# Patient Record
Sex: FEMALE | Race: OTHER | Employment: FULL TIME | URBAN - METROPOLITAN AREA
[De-identification: names, ages, dates, MRNs, and addresses within clinical notes are randomized per-mention and may not be internally consistent; named-entity substitution may affect disease eponyms.]

---

## 2021-10-05 ENCOUNTER — HOSPITAL ENCOUNTER (EMERGENCY)
Age: 21
Discharge: HOME | End: 2021-10-05
Attending: EMERGENCY MEDICINE

## 2021-10-05 ENCOUNTER — APPOINTMENT (OUTPATIENT)
Dept: CT IMAGING | Age: 21
End: 2021-10-05
Attending: EMERGENCY MEDICINE

## 2021-10-05 ENCOUNTER — ANESTHESIA (OUTPATIENT)
Dept: SURGERY | Age: 21
End: 2021-10-05

## 2021-10-05 ENCOUNTER — ANESTHESIA EVENT (OUTPATIENT)
Dept: SURGERY | Age: 21
End: 2021-10-05

## 2021-10-05 VITALS
BODY MASS INDEX: 27.6 KG/M2 | RESPIRATION RATE: 18 BRPM | TEMPERATURE: 98.1 F | DIASTOLIC BLOOD PRESSURE: 58 MMHG | OXYGEN SATURATION: 97 % | HEART RATE: 65 BPM | HEIGHT: 62 IN | WEIGHT: 150 LBS | SYSTOLIC BLOOD PRESSURE: 107 MMHG

## 2021-10-05 DIAGNOSIS — R10.31 ABDOMINAL PAIN, RIGHT LOWER QUADRANT: ICD-10-CM

## 2021-10-05 DIAGNOSIS — K35.30 ACUTE APPENDICITIS WITH LOCALIZED PERITONITIS, WITHOUT PERFORATION, ABSCESS, OR GANGRENE: Primary | ICD-10-CM

## 2021-10-05 DIAGNOSIS — N83.201 CYST OF RIGHT OVARY: ICD-10-CM

## 2021-10-05 PROBLEM — K35.20 ACUTE APPENDICITIS WITH GENERALIZED PERITONITIS, WITHOUT GANGRENE OR ABSCESS: Status: ACTIVE | Noted: 2021-10-05

## 2021-10-05 LAB
ABO + RH BLD: NORMAL
ALBUMIN SERPL-MCNC: 4 G/DL (ref 3.5–5)
ALBUMIN/GLOB SERPL: 1 {RATIO} (ref 1.2–3.5)
ALP SERPL-CCNC: 80 U/L (ref 50–136)
ALT SERPL-CCNC: 42 U/L (ref 12–65)
ANION GAP SERPL CALC-SCNC: 7 MMOL/L (ref 7–16)
AST SERPL-CCNC: 18 U/L (ref 15–37)
BASOPHILS # BLD: 0.1 K/UL (ref 0–0.2)
BASOPHILS NFR BLD: 1 % (ref 0–2)
BILIRUB SERPL-MCNC: 0.8 MG/DL (ref 0.2–1.1)
BLOOD GROUP ANTIBODIES SERPL: NORMAL
BUN SERPL-MCNC: 11 MG/DL (ref 6–23)
CALCIUM SERPL-MCNC: 9.5 MG/DL (ref 8.3–10.4)
CHLORIDE SERPL-SCNC: 105 MMOL/L (ref 98–107)
CO2 SERPL-SCNC: 28 MMOL/L (ref 21–32)
COVID-19 RAPID TEST, COVR: NOT DETECTED
CREAT SERPL-MCNC: 0.73 MG/DL (ref 0.6–1)
DIFFERENTIAL METHOD BLD: ABNORMAL
EOSINOPHIL # BLD: 0.3 K/UL (ref 0–0.8)
EOSINOPHIL NFR BLD: 2 % (ref 0.5–7.8)
ERYTHROCYTE [DISTWIDTH] IN BLOOD BY AUTOMATED COUNT: 12.8 % (ref 11.9–14.6)
GLOBULIN SER CALC-MCNC: 4.1 G/DL (ref 2.3–3.5)
GLUCOSE SERPL-MCNC: 69 MG/DL (ref 65–100)
HCG UR QL: NEGATIVE
HCT VFR BLD AUTO: 38.8 % (ref 35.8–46.3)
HGB BLD-MCNC: 12.6 G/DL (ref 11.7–15.4)
IMM GRANULOCYTES # BLD AUTO: 0.1 K/UL (ref 0–0.5)
IMM GRANULOCYTES NFR BLD AUTO: 1 % (ref 0–5)
LIPASE SERPL-CCNC: 104 U/L (ref 73–393)
LYMPHOCYTES # BLD: 3.1 K/UL (ref 0.5–4.6)
LYMPHOCYTES NFR BLD: 22 % (ref 13–44)
MCH RBC QN AUTO: 29.2 PG (ref 26.1–32.9)
MCHC RBC AUTO-ENTMCNC: 32.5 G/DL (ref 31.4–35)
MCV RBC AUTO: 89.8 FL (ref 79.6–97.8)
MONOCYTES # BLD: 0.7 K/UL (ref 0.1–1.3)
MONOCYTES NFR BLD: 5 % (ref 4–12)
NEUTS SEG # BLD: 10.2 K/UL (ref 1.7–8.2)
NEUTS SEG NFR BLD: 71 % (ref 43–78)
NRBC # BLD: 0 K/UL (ref 0–0.2)
PLATELET # BLD AUTO: 278 K/UL (ref 150–450)
PMV BLD AUTO: 10.1 FL (ref 9.4–12.3)
POTASSIUM SERPL-SCNC: 3.3 MMOL/L (ref 3.5–5.1)
PROT SERPL-MCNC: 8.1 G/DL (ref 6.3–8.2)
RBC # BLD AUTO: 4.32 M/UL (ref 4.05–5.2)
SODIUM SERPL-SCNC: 140 MMOL/L (ref 136–145)
SOURCE, COVRS: NORMAL
SPECIMEN EXP DATE BLD: NORMAL
WBC # BLD AUTO: 14.3 K/UL (ref 4.3–11.1)

## 2021-10-05 PROCEDURE — 74011000636 HC RX REV CODE- 636: Performed by: EMERGENCY MEDICINE

## 2021-10-05 PROCEDURE — 74011250636 HC RX REV CODE- 250/636: Performed by: ANESTHESIOLOGY

## 2021-10-05 PROCEDURE — 81025 URINE PREGNANCY TEST: CPT

## 2021-10-05 PROCEDURE — 77030007955 HC PCH ENDOSC SPEC J&J -B: Performed by: SURGERY

## 2021-10-05 PROCEDURE — 77030011810 HC STPLR ENDOSC J&J -G: Performed by: SURGERY

## 2021-10-05 PROCEDURE — 99282 EMERGENCY DEPT VISIT SF MDM: CPT

## 2021-10-05 PROCEDURE — 2709999900 HC NON-CHARGEABLE SUPPLY: Performed by: SURGERY

## 2021-10-05 PROCEDURE — 96374 THER/PROPH/DIAG INJ IV PUSH: CPT

## 2021-10-05 PROCEDURE — 44970 LAPAROSCOPY APPENDECTOMY: CPT | Performed by: SURGERY

## 2021-10-05 PROCEDURE — 77030039425 HC BLD LARYNG TRULITE DISP TELE -A: Performed by: ANESTHESIOLOGY

## 2021-10-05 PROCEDURE — 99223 1ST HOSP IP/OBS HIGH 75: CPT | Performed by: SURGERY

## 2021-10-05 PROCEDURE — 77030020829: Performed by: SURGERY

## 2021-10-05 PROCEDURE — 74011000250 HC RX REV CODE- 250: Performed by: SURGERY

## 2021-10-05 PROCEDURE — 76210000020 HC REC RM PH II FIRST 0.5 HR: Performed by: SURGERY

## 2021-10-05 PROCEDURE — 77030016151 HC PROTCTR LNS DFOG COVD -B: Performed by: SURGERY

## 2021-10-05 PROCEDURE — 88173 CYTOPATH EVAL FNA REPORT: CPT

## 2021-10-05 PROCEDURE — 58661 LAPAROSCOPY REMOVE ADNEXA: CPT | Performed by: SURGERY

## 2021-10-05 PROCEDURE — 77030031139 HC SUT VCRL2 J&J -A: Performed by: SURGERY

## 2021-10-05 PROCEDURE — 88304 TISSUE EXAM BY PATHOLOGIST: CPT

## 2021-10-05 PROCEDURE — 77030012799 HC TRCR GELPRT BLN AMR -B: Performed by: SURGERY

## 2021-10-05 PROCEDURE — 76060000034 HC ANESTHESIA 1.5 TO 2 HR: Performed by: SURGERY

## 2021-10-05 PROCEDURE — 77030009967 HC RELD STPLR ENDOSC J&J -C: Performed by: SURGERY

## 2021-10-05 PROCEDURE — 74177 CT ABD & PELVIS W/CONTRAST: CPT

## 2021-10-05 PROCEDURE — 77030040922 HC BLNKT HYPOTHRM STRY -A: Performed by: ANESTHESIOLOGY

## 2021-10-05 PROCEDURE — 74011000250 HC RX REV CODE- 250: Performed by: NURSE ANESTHETIST, CERTIFIED REGISTERED

## 2021-10-05 PROCEDURE — 81003 URINALYSIS AUTO W/O SCOPE: CPT

## 2021-10-05 PROCEDURE — 77030040361 HC SLV COMPR DVT MDII -B: Performed by: SURGERY

## 2021-10-05 PROCEDURE — 76010000162 HC OR TIME 1.5 TO 2 HR INTENSV-TIER 1: Performed by: SURGERY

## 2021-10-05 PROCEDURE — 77030040830 HC CATH URETH FOL MDII -A: Performed by: SURGERY

## 2021-10-05 PROCEDURE — 77030008771 HC TU NG SALEM SUMP -A: Performed by: ANESTHESIOLOGY

## 2021-10-05 PROCEDURE — 87635 SARS-COV-2 COVID-19 AMP PRB: CPT

## 2021-10-05 PROCEDURE — 74011250637 HC RX REV CODE- 250/637: Performed by: ANESTHESIOLOGY

## 2021-10-05 PROCEDURE — 77030037088 HC TUBE ENDOTRACH ORAL NSL COVD-A: Performed by: ANESTHESIOLOGY

## 2021-10-05 PROCEDURE — 86901 BLOOD TYPING SEROLOGIC RH(D): CPT

## 2021-10-05 PROCEDURE — 77030008756 HC TU IRR SUC STRY -B: Performed by: SURGERY

## 2021-10-05 PROCEDURE — 85025 COMPLETE CBC W/AUTO DIFF WBC: CPT

## 2021-10-05 PROCEDURE — 83690 ASSAY OF LIPASE: CPT

## 2021-10-05 PROCEDURE — 58661 LAPAROSCOPY REMOVE ADNEXA: CPT | Performed by: OBSTETRICS & GYNECOLOGY

## 2021-10-05 PROCEDURE — 76210000006 HC OR PH I REC 0.5 TO 1 HR: Performed by: SURGERY

## 2021-10-05 PROCEDURE — 74011000258 HC RX REV CODE- 258: Performed by: NURSE PRACTITIONER

## 2021-10-05 PROCEDURE — 77030008606 HC TRCR ENDOSC KII AMR -B: Performed by: SURGERY

## 2021-10-05 PROCEDURE — 77030034154 HC SHR COAG HARM ACE J&J -F: Performed by: SURGERY

## 2021-10-05 PROCEDURE — 77030019908 HC STETH ESOPH SIMS -A: Performed by: ANESTHESIOLOGY

## 2021-10-05 PROCEDURE — 74011250636 HC RX REV CODE- 250/636: Performed by: NURSE PRACTITIONER

## 2021-10-05 PROCEDURE — 74011250636 HC RX REV CODE- 250/636: Performed by: NURSE ANESTHETIST, CERTIFIED REGISTERED

## 2021-10-05 PROCEDURE — 80053 COMPREHEN METABOLIC PANEL: CPT

## 2021-10-05 PROCEDURE — 74011000258 HC RX REV CODE- 258: Performed by: EMERGENCY MEDICINE

## 2021-10-05 RX ORDER — KETOROLAC TROMETHAMINE 30 MG/ML
INJECTION, SOLUTION INTRAMUSCULAR; INTRAVENOUS AS NEEDED
Status: DISCONTINUED | OUTPATIENT
Start: 2021-10-05 | End: 2021-10-05 | Stop reason: HOSPADM

## 2021-10-05 RX ORDER — OXYCODONE AND ACETAMINOPHEN 5; 325 MG/1; MG/1
1 TABLET ORAL
Qty: 20 TABLET | Refills: 0 | Status: SHIPPED | OUTPATIENT
Start: 2021-10-05 | End: 2021-10-10

## 2021-10-05 RX ORDER — ACETAMINOPHEN 500 MG
1000 TABLET ORAL ONCE
Status: COMPLETED | OUTPATIENT
Start: 2021-10-05 | End: 2021-10-05

## 2021-10-05 RX ORDER — NALOXONE HYDROCHLORIDE 0.4 MG/ML
0.2 INJECTION, SOLUTION INTRAMUSCULAR; INTRAVENOUS; SUBCUTANEOUS
Status: DISCONTINUED | OUTPATIENT
Start: 2021-10-05 | End: 2021-10-05 | Stop reason: HOSPADM

## 2021-10-05 RX ORDER — SODIUM CHLORIDE 0.9 % (FLUSH) 0.9 %
5-10 SYRINGE (ML) INJECTION EVERY 8 HOURS
Status: DISCONTINUED | OUTPATIENT
Start: 2021-10-05 | End: 2021-10-06 | Stop reason: HOSPADM

## 2021-10-05 RX ORDER — MIDAZOLAM HYDROCHLORIDE 1 MG/ML
2 INJECTION, SOLUTION INTRAMUSCULAR; INTRAVENOUS
Status: DISCONTINUED | OUTPATIENT
Start: 2021-10-05 | End: 2021-10-05 | Stop reason: HOSPADM

## 2021-10-05 RX ORDER — SODIUM CHLORIDE, SODIUM LACTATE, POTASSIUM CHLORIDE, CALCIUM CHLORIDE 600; 310; 30; 20 MG/100ML; MG/100ML; MG/100ML; MG/100ML
25 INJECTION, SOLUTION INTRAVENOUS CONTINUOUS
Status: DISCONTINUED | OUTPATIENT
Start: 2021-10-05 | End: 2021-10-05 | Stop reason: HOSPADM

## 2021-10-05 RX ORDER — HALOPERIDOL 5 MG/ML
1 INJECTION INTRAMUSCULAR
Status: DISCONTINUED | OUTPATIENT
Start: 2021-10-05 | End: 2021-10-06 | Stop reason: HOSPADM

## 2021-10-05 RX ORDER — GLYCOPYRROLATE 0.2 MG/ML
INJECTION INTRAMUSCULAR; INTRAVENOUS AS NEEDED
Status: DISCONTINUED | OUTPATIENT
Start: 2021-10-05 | End: 2021-10-05 | Stop reason: HOSPADM

## 2021-10-05 RX ORDER — OXYCODONE HYDROCHLORIDE 5 MG/1
5 TABLET ORAL
Status: COMPLETED | OUTPATIENT
Start: 2021-10-05 | End: 2021-10-05

## 2021-10-05 RX ORDER — SODIUM CHLORIDE 0.9 % (FLUSH) 0.9 %
10 SYRINGE (ML) INJECTION
Status: COMPLETED | OUTPATIENT
Start: 2021-10-05 | End: 2021-10-05

## 2021-10-05 RX ORDER — LIDOCAINE HYDROCHLORIDE 20 MG/ML
INJECTION, SOLUTION EPIDURAL; INFILTRATION; INTRACAUDAL; PERINEURAL AS NEEDED
Status: DISCONTINUED | OUTPATIENT
Start: 2021-10-05 | End: 2021-10-05 | Stop reason: HOSPADM

## 2021-10-05 RX ORDER — NALOXONE HYDROCHLORIDE 0.4 MG/ML
0.2 INJECTION, SOLUTION INTRAMUSCULAR; INTRAVENOUS; SUBCUTANEOUS AS NEEDED
Status: DISCONTINUED | OUTPATIENT
Start: 2021-10-05 | End: 2021-10-06 | Stop reason: HOSPADM

## 2021-10-05 RX ORDER — SODIUM CHLORIDE, SODIUM LACTATE, POTASSIUM CHLORIDE, CALCIUM CHLORIDE 600; 310; 30; 20 MG/100ML; MG/100ML; MG/100ML; MG/100ML
75 INJECTION, SOLUTION INTRAVENOUS CONTINUOUS
Status: DISCONTINUED | OUTPATIENT
Start: 2021-10-05 | End: 2021-10-06 | Stop reason: HOSPADM

## 2021-10-05 RX ORDER — HYDROMORPHONE HYDROCHLORIDE 2 MG/ML
0.5 INJECTION, SOLUTION INTRAMUSCULAR; INTRAVENOUS; SUBCUTANEOUS
Status: DISCONTINUED | OUTPATIENT
Start: 2021-10-05 | End: 2021-10-06 | Stop reason: HOSPADM

## 2021-10-05 RX ORDER — NEOSTIGMINE METHYLSULFATE 1 MG/ML
INJECTION, SOLUTION INTRAVENOUS AS NEEDED
Status: DISCONTINUED | OUTPATIENT
Start: 2021-10-05 | End: 2021-10-05 | Stop reason: HOSPADM

## 2021-10-05 RX ORDER — KETAMINE HYDROCHLORIDE 50 MG/ML
INJECTION, SOLUTION INTRAMUSCULAR; INTRAVENOUS AS NEEDED
Status: DISCONTINUED | OUTPATIENT
Start: 2021-10-05 | End: 2021-10-05 | Stop reason: HOSPADM

## 2021-10-05 RX ORDER — FENTANYL CITRATE 50 UG/ML
100 INJECTION, SOLUTION INTRAMUSCULAR; INTRAVENOUS ONCE
Status: DISCONTINUED | OUTPATIENT
Start: 2021-10-05 | End: 2021-10-05 | Stop reason: HOSPADM

## 2021-10-05 RX ORDER — DEXAMETHASONE SODIUM PHOSPHATE 4 MG/ML
INJECTION, SOLUTION INTRA-ARTICULAR; INTRALESIONAL; INTRAMUSCULAR; INTRAVENOUS; SOFT TISSUE AS NEEDED
Status: DISCONTINUED | OUTPATIENT
Start: 2021-10-05 | End: 2021-10-05 | Stop reason: HOSPADM

## 2021-10-05 RX ORDER — LIDOCAINE HYDROCHLORIDE 10 MG/ML
0.1 INJECTION INFILTRATION; PERINEURAL AS NEEDED
Status: DISCONTINUED | OUTPATIENT
Start: 2021-10-05 | End: 2021-10-05 | Stop reason: HOSPADM

## 2021-10-05 RX ORDER — BUPIVACAINE HYDROCHLORIDE AND EPINEPHRINE 5; 5 MG/ML; UG/ML
INJECTION, SOLUTION EPIDURAL; INTRACAUDAL; PERINEURAL AS NEEDED
Status: DISCONTINUED | OUTPATIENT
Start: 2021-10-05 | End: 2021-10-05 | Stop reason: HOSPADM

## 2021-10-05 RX ORDER — SUCCINYLCHOLINE CHLORIDE 20 MG/ML
INJECTION INTRAMUSCULAR; INTRAVENOUS AS NEEDED
Status: DISCONTINUED | OUTPATIENT
Start: 2021-10-05 | End: 2021-10-05 | Stop reason: HOSPADM

## 2021-10-05 RX ORDER — MIDAZOLAM HYDROCHLORIDE 1 MG/ML
2 INJECTION, SOLUTION INTRAMUSCULAR; INTRAVENOUS ONCE
Status: DISCONTINUED | OUTPATIENT
Start: 2021-10-05 | End: 2021-10-05 | Stop reason: HOSPADM

## 2021-10-05 RX ORDER — PROPOFOL 10 MG/ML
INJECTION, EMULSION INTRAVENOUS AS NEEDED
Status: DISCONTINUED | OUTPATIENT
Start: 2021-10-05 | End: 2021-10-05 | Stop reason: HOSPADM

## 2021-10-05 RX ORDER — ONDANSETRON 2 MG/ML
4 INJECTION INTRAMUSCULAR; INTRAVENOUS
Status: DISCONTINUED | OUTPATIENT
Start: 2021-10-05 | End: 2021-10-05 | Stop reason: HOSPADM

## 2021-10-05 RX ORDER — FENTANYL CITRATE 50 UG/ML
INJECTION, SOLUTION INTRAMUSCULAR; INTRAVENOUS AS NEEDED
Status: DISCONTINUED | OUTPATIENT
Start: 2021-10-05 | End: 2021-10-05 | Stop reason: HOSPADM

## 2021-10-05 RX ORDER — ROCURONIUM BROMIDE 10 MG/ML
INJECTION, SOLUTION INTRAVENOUS AS NEEDED
Status: DISCONTINUED | OUTPATIENT
Start: 2021-10-05 | End: 2021-10-05 | Stop reason: HOSPADM

## 2021-10-05 RX ORDER — SODIUM CHLORIDE 0.9 % (FLUSH) 0.9 %
5-10 SYRINGE (ML) INJECTION AS NEEDED
Status: DISCONTINUED | OUTPATIENT
Start: 2021-10-05 | End: 2021-10-06 | Stop reason: HOSPADM

## 2021-10-05 RX ORDER — ONDANSETRON 2 MG/ML
INJECTION INTRAMUSCULAR; INTRAVENOUS AS NEEDED
Status: DISCONTINUED | OUTPATIENT
Start: 2021-10-05 | End: 2021-10-05 | Stop reason: HOSPADM

## 2021-10-05 RX ADMIN — Medication 10 ML: at 16:08

## 2021-10-05 RX ADMIN — FENTANYL CITRATE 100 MCG: 50 INJECTION INTRAMUSCULAR; INTRAVENOUS at 19:11

## 2021-10-05 RX ADMIN — Medication 3 MG: at 20:30

## 2021-10-05 RX ADMIN — GLYCOPYRROLATE 0.8 MG: 0.2 INJECTION, SOLUTION INTRAMUSCULAR; INTRAVENOUS at 20:30

## 2021-10-05 RX ADMIN — ROCURONIUM BROMIDE 20 MG: 10 INJECTION, SOLUTION INTRAVENOUS at 19:13

## 2021-10-05 RX ADMIN — FENTANYL CITRATE 25 MCG: 50 INJECTION INTRAMUSCULAR; INTRAVENOUS at 20:56

## 2021-10-05 RX ADMIN — SODIUM CHLORIDE, SODIUM LACTATE, POTASSIUM CHLORIDE, AND CALCIUM CHLORIDE: 600; 310; 30; 20 INJECTION, SOLUTION INTRAVENOUS at 20:04

## 2021-10-05 RX ADMIN — HYDROMORPHONE HYDROCHLORIDE 0.5 MG: 2 INJECTION, SOLUTION INTRAMUSCULAR; INTRAVENOUS; SUBCUTANEOUS at 21:19

## 2021-10-05 RX ADMIN — ROCURONIUM BROMIDE 10 MG: 10 INJECTION, SOLUTION INTRAVENOUS at 19:11

## 2021-10-05 RX ADMIN — SODIUM CHLORIDE 100 ML: 900 INJECTION, SOLUTION INTRAVENOUS at 16:08

## 2021-10-05 RX ADMIN — KETAMINE HYDROCHLORIDE 30 MG: 50 INJECTION INTRAMUSCULAR; INTRAVENOUS at 19:11

## 2021-10-05 RX ADMIN — DEXAMETHASONE SODIUM PHOSPHATE 4 MG: 4 INJECTION, SOLUTION INTRAMUSCULAR; INTRAVENOUS at 19:31

## 2021-10-05 RX ADMIN — ACETAMINOPHEN 1000 MG: 500 TABLET ORAL at 18:23

## 2021-10-05 RX ADMIN — KETOROLAC TROMETHAMINE 30 MG: 30 INJECTION, SOLUTION INTRAMUSCULAR; INTRAVENOUS at 20:29

## 2021-10-05 RX ADMIN — LIDOCAINE HYDROCHLORIDE 80 MG: 20 INJECTION, SOLUTION EPIDURAL; INFILTRATION; INTRACAUDAL; PERINEURAL at 19:11

## 2021-10-05 RX ADMIN — FENTANYL CITRATE 50 MCG: 50 INJECTION INTRAMUSCULAR; INTRAVENOUS at 19:27

## 2021-10-05 RX ADMIN — SUCCINYLCHOLINE CHLORIDE 140 MG: 20 INJECTION, SOLUTION INTRAMUSCULAR; INTRAVENOUS at 19:11

## 2021-10-05 RX ADMIN — ONDANSETRON 4 MG: 2 INJECTION INTRAMUSCULAR; INTRAVENOUS at 20:28

## 2021-10-05 RX ADMIN — OXYCODONE 5 MG: 5 TABLET ORAL at 21:07

## 2021-10-05 RX ADMIN — ROCURONIUM BROMIDE 10 MG: 10 INJECTION, SOLUTION INTRAVENOUS at 19:46

## 2021-10-05 RX ADMIN — PHENYLEPHRINE HYDROCHLORIDE 100 MCG: 10 INJECTION INTRAVENOUS at 19:20

## 2021-10-05 RX ADMIN — SODIUM CHLORIDE, SODIUM LACTATE, POTASSIUM CHLORIDE, AND CALCIUM CHLORIDE 25 ML/HR: 600; 310; 30; 20 INJECTION, SOLUTION INTRAVENOUS at 18:24

## 2021-10-05 RX ADMIN — PROPOFOL 150 MG: 10 INJECTION, EMULSION INTRAVENOUS at 19:11

## 2021-10-05 RX ADMIN — IOPAMIDOL 100 ML: 755 INJECTION, SOLUTION INTRAVENOUS at 16:07

## 2021-10-05 RX ADMIN — PIPERACILLIN SODIUM AND TAZOBACTAM SODIUM 4.5 G: 4; .5 INJECTION, POWDER, LYOPHILIZED, FOR SOLUTION INTRAVENOUS at 17:07

## 2021-10-05 RX ADMIN — FENTANYL CITRATE 25 MCG: 50 INJECTION INTRAMUSCULAR; INTRAVENOUS at 20:54

## 2021-10-05 NOTE — ED NOTES
TRANSFER - OUT REPORT:    Verbal report given to MARISA butler on [facility]  being transferred to preop for ordered procedure       Report consisted of patients Situation, Background, Assessment and   Recommendations(SBAR). Information from the following report(s) SBAR, ED Summary and MAR was reviewed with the receiving nurse. Lines:   Peripheral IV 10/05/21 Right Antecubital (Active)   Site Assessment Clean, dry, & intact 10/05/21 1426   Phlebitis Assessment 0 10/05/21 1426   Infiltration Assessment 0 10/05/21 1426   Dressing Status Clean, dry, & intact 10/05/21 1426        Opportunity for questions and clarification was provided.

## 2021-10-05 NOTE — ED TRIAGE NOTES
Pt arrives via pov c/o lower abd discomfort and nausea. Denies diarrhea/v. Pt took at home pregnancy test that was neg. Denies dysuria. Pain does not radiate.

## 2021-10-05 NOTE — H&P
Rohit Buchanan    10/5/2021    Date of Admission:  10/5/2021      Subjective: This is a 69-year-old female presents to emergency department complaining of generalized abdominal pain. Patient states that she has been having waxing and waning abdominal pain for the past 2 months. Pain is rated 5 out of 10. She states that it reoccurred today and the pain is in the lower abdomen bilaterally. She denies any associated fevers nausea or vomiting. She denies any change in appetite or anorexia. She denies any changes in her bowel habits. She is unaware of having any ovarian cysts. Work-up in the emergency department reveals a slightly elevated white blood cell count 14,000 and CT revealing a 20 cm right ovarian cyst and a appendix with thickened wall. Surgery is asked to evaluate the patient for acute appendicitis and surgery has consulted GYN surgery for management of the 20 cm cyst.  Surgery is here for decision for laparoscopic appendectomy. CT ABDOMEN AND PELVIS WITH INTRAVENOUS CONTRAST DATED 10/5/2021.     History: Lower abdominal discomfort and nausea.      Comparison: None.      Technique:   Multiple contiguous helical CT images reconstructed at 5 mm  intervals were obtained from above the diaphragms through the ischial  tuberosities following 100 cc Isovue-370 without acute complication. All CT  scans performed at this facility use one or all of the following: Automated  exposure control, adjustment of the mA and/or kVp according to patient's size,  iterative reconstruction.     Findings:  CT ABDOMEN:    Limited evaluation of the lung bases and base of the mediastinum demonstrates no  significant abnormalities.      The Liver is homogeneous in attenuation. The spleen is homogeneous in  attenuation. No contour deforming or enhancing mass lesions are seen of the  pancreas or adrenal glands.   The gallbladder has an unremarkable CT appearance  without radiopaque stones or pericholecystic fluid/inflammatory changes. The  kidneys enhance symmetrically and no evidence of hydronephrosis is seen.       The visualized loops of small bowel and colon are normal in caliber. There is an  enhancing structure in the right lower quadrant which directly contacts the  cecal tip and appears to end abruptly best appreciated on axial postcontrast  image 59. This measures 9 mm in width and demonstrates adjacent mesenteric  stranding. The appearance is concerning for acute appendicitis. No free air or  abscess is seen at this time. There is a large cystic mass extending from the  pelvis which will be described with the pelvis findings. No adenopathy is seen. The abdominal aorta is unremarkable in appearance.     CT PELVIS:  No abnormal pelvic fluid collections or inflammatory changes are present. There  is a large cystic mass which appears to reside from the right adnexa measuring  16.3 cm transverse by 9.7 cm AP by 18.5 cm craniocaudal in size, and 5  Hounsfield units in attenuation. The appearance, and low attenuation is most  suggestive of a large right ovarian cyst which may be functional. No clear acute  findings are seen associated with this or abnormal masslike enhancement. No  pelvic adenopathy is seen. The urinary bladder is unremarkable.     IMPRESSION  1. Findings concerning for early acute appendicitis as described above. No free  air or abscess is currently seen.     2. Large cystic mass measuring 16.3 cm x 9.7 cm x 18.5 cm arising from the right  adnexa likely representing a a large functional ovarian cyst particularly in  this young patient. No acute features are clearly seen. However, the size of  this could easily predispose the right ovary to torsion. Signs/symptoms of acute  torsion should be excluded. If these subsequently arise then these should be  assessed with imaging at that time. Given the size of this, a follow-up pelvic  ultrasound in 6 weeks would be recommended to ensure resolution. This can be  performed sooner if the patient's develops acute pelvic symptoms.       Patient Active Problem List    Diagnosis Date Noted    Acute appendicitis with generalized peritonitis, without gangrene or abscess 10/05/2021    Cyst of right ovary 10/05/2021     History reviewed. No pertinent past medical history. History reviewed. No pertinent surgical history. None     No Known Allergies   Social History     Tobacco Use    Smoking status: Never Smoker   Substance Use Topics    Alcohol use: Never      Social History     Social History Narrative    Not on file     History reviewed. No pertinent family history. Current Facility-Administered Medications   Medication Dose Route Frequency    sodium chloride (NS) flush 5-10 mL  5-10 mL IntraVENous Q8H    sodium chloride (NS) flush 5-10 mL  5-10 mL IntraVENous PRN    lidocaine (XYLOCAINE) 10 mg/mL (1 %) injection 0.1 mL  0.1 mL SubCUTAneous PRN    lactated Ringers infusion  25 mL/hr IntraVENous CONTINUOUS    fentaNYL citrate (PF) injection 100 mcg  100 mcg IntraVENous ONCE    midazolam (VERSED) injection 2 mg  2 mg IntraVENous ONCE PRN    midazolam (VERSED) injection 2 mg  2 mg IntraVENous ONCE    naloxone (NARCAN) injection 0.2 mg  0.2 mg IntraVENous Multiple    ondansetron (ZOFRAN) injection 4 mg  4 mg IntraVENous ONCE PRN       Review of Systems  A comprehensive review of systems was negative except for that written in the HPI.     Objective:     Vitals:    10/05/21 1425 10/05/21 1838   BP: 122/83 118/76   Pulse: 87 82   Resp: 17 15   Temp: 98 °F (36.7 °C) 98.3 °F (36.8 °C)   SpO2: 98% 98%   Weight: 150 lb (68 kg) 150 lb (68 kg)   Height: 5' 2\" (1.575 m) 5' 2\" (1.575 m)     PHYSICAL EXAM   Physical Examination: General appearance - alert, well appearing, and in no distress  Mental status - alert, oriented to person, place, and time  Eyes - pupils equal and reactive, extraocular eye movements intact  Nose - normal and patent, no erythema, discharge or polyps  Neck - supple, no significant adenopathy  Chest - clear to auscultation, no wheezes, rales or rhonchi, symmetric air entry  Heart - normal rate, regular rhythm, normal S1, S2, no murmurs, rubs, clicks or gallops  Abdomen -soft with diffuse tenderness to palpation. There is palpable ovarian cyst just above the umbilicus. There is mild tenderness to palpation without rebound. Neurological - alert, oriented, normal speech, no focal findings or movement disorder noted  Musculoskeletal - no joint tenderness, deformity or swelling  Extremities - peripheral pulses normal, no pedal edema, no clubbing or cyanosis  Skin - normal coloration and turgor, no rashes, no suspicious skin lesions noted      Recent Labs     10/05/21  1427   WBC 14.3*   HGB 12.6   HCT 38.8        Recent Labs     10/05/21  1427      K 3.3*      GLU 69   CO2 28   BUN 11   CREA 0.73     No results for input(s): PH, PCO2, PO2, HCO3 in the last 72 hours.     Assessment:     Hospital Problems  Date Reviewed: 10/5/2021        Codes Class Noted POA    * (Principal) Acute appendicitis with generalized peritonitis, without gangrene or abscess ICD-10-CM: K35.20  ICD-9-CM: 540.0  10/5/2021 Unknown        Cyst of right ovary ICD-10-CM: N83.201  ICD-9-CM: 620.2  10/5/2021 Unknown            Plan:   Acute appendicitis with 20 cm ovarian cyst.  Decision for laparoscopic appendectomy with GYN surgery Dr. Chacho Chaney to manage the ovarian cyst  Consent for laparoscopic appendectomy and laparoscopic drainage of ovarian cyst with possible open procedure  Zosyn 3.375 mg IV administered in the emergency department  N.p.o.  Moss catheter and surgery    Judy Grove DO

## 2021-10-05 NOTE — ANESTHESIA PREPROCEDURE EVALUATION
Relevant Problems   No relevant active problems       Anesthetic History   No history of anesthetic complications            Review of Systems / Medical History  Patient summary reviewed and pertinent labs reviewed    Pulmonary  Within defined limits                 Neuro/Psych   Within defined limits           Cardiovascular                  Exercise tolerance: >4 METS  Comments: Denies CP, SOB or changes in functional status   GI/Hepatic/Renal  Within defined limits              Endo/Other  Within defined limits           Other Findings   Comments: Large ovarian cyst           Physical Exam    Airway  Mallampati: II  TM Distance: 4 - 6 cm  Neck ROM: normal range of motion   Mouth opening: Normal     Cardiovascular    Rhythm: regular  Rate: normal         Dental  No notable dental hx       Pulmonary  Breath sounds clear to auscultation               Abdominal  GI exam deferred       Other Findings            Anesthetic Plan    ASA: 2, emergent  Anesthesia type: general          Induction: Intravenous and RSI  Anesthetic plan and risks discussed with: Patient

## 2021-10-05 NOTE — ED PROVIDER NOTES
22-year-old female presents emergency department today with complaint of lower abdominal pain. Patient reports pain began this morning. Patient reports she had some nausea as well. She denies any fever, chills, vomiting, or diarrhea. She reports last bowel movement was yesterday and was normal.  Patient states that her last menstrual period just ended about 3 days ago. Patient denies any ill contacts. The history is provided by the patient. Abdominal Pain   This is a new problem. The current episode started 6 to 12 hours ago. The problem occurs constantly. The problem has not changed since onset. The pain is located in the RLQ and LLQ. The pain is moderate. Associated symptoms include nausea. Pertinent negatives include no fever, no diarrhea, no melena, no vomiting, no constipation, no dysuria, no frequency, no hematuria and no back pain. The pain is worsened by palpation. The pain is relieved by nothing. History reviewed. No pertinent past medical history. History reviewed. No pertinent surgical history. History reviewed. No pertinent family history. Social History     Socioeconomic History    Marital status: SINGLE     Spouse name: Not on file    Number of children: Not on file    Years of education: Not on file    Highest education level: Not on file   Occupational History    Not on file   Tobacco Use    Smoking status: Never Smoker   Substance and Sexual Activity    Alcohol use: Never    Drug use: Never    Sexual activity: Yes   Other Topics Concern    Not on file   Social History Narrative    Not on file     Social Determinants of Health     Financial Resource Strain:     Difficulty of Paying Living Expenses:    Food Insecurity:     Worried About Running Out of Food in the Last Year:     [address] in the Last Year:    Transportation Needs:     Lack of Transportation (Medical):      Lack of Transportation (Non-Medical):    Physical Activity:     Days of Exercise per Week:     Minutes of Exercise per Session:    Stress:     Feeling of Stress :    Social Connections:     Frequency of Communication with Friends and Family:     Frequency of Social Gatherings with Friends and Family:     Attends Taoist Services:     Active Member of Clubs or Organizations:     Attends Club or Organization Meetings:     Marital Status:    Intimate Partner Violence:     Fear of Current or Ex-Partner:     Emotionally Abused:     Physically Abused:     Sexually Abused: ALLERGIES: Patient has no known allergies. Review of Systems   Constitutional: Negative for fever. Gastrointestinal: Positive for abdominal pain and nausea. Negative for constipation, diarrhea, melena and vomiting. Genitourinary: Negative for dysuria, frequency and hematuria. Musculoskeletal: Negative for back pain. All other systems reviewed and are negative. Vitals:    10/05/21 1425   BP: 122/83   Pulse: 87   Resp: 17   Temp: 98 °F (36.7 °C)   SpO2: 98%   Weight: 68 kg (150 lb)   Height: 5' 2\" (1.575 m)            Physical Exam  Vitals and nursing note reviewed. Constitutional:       General: She is not in acute distress. Appearance: Normal appearance. She is well-developed. She is not ill-appearing, toxic-appearing or diaphoretic. HENT:      Head: Normocephalic and atraumatic. Right Ear: External ear normal.      Left Ear: External ear normal.      Mouth/Throat:      Mouth: Mucous membranes are moist.      Pharynx: Oropharynx is clear. Eyes:      General: No scleral icterus. Extraocular Movements: Extraocular movements intact. Conjunctiva/sclera: Conjunctivae normal.   Cardiovascular:      Rate and Rhythm: Normal rate. Pulses: Normal pulses. Heart sounds: Normal heart sounds. Pulmonary:      Effort: Pulmonary effort is normal. No respiratory distress. Breath sounds: Normal breath sounds. Abdominal:      General: Abdomen is flat.  Bowel sounds are normal. There is no distension. Palpations: Abdomen is soft. Tenderness: There is abdominal tenderness in the right lower quadrant. There is guarding. Comments: Abdomen is soft on exam.  Patient does have some right lower quadrant tenderness near her umbilicus. No tenderness with palpation in the pelvis. Musculoskeletal:         General: Normal range of motion. Cervical back: Normal range of motion and neck supple. No rigidity. Right lower leg: No edema. Left lower leg: No edema. Skin:     General: Skin is warm and dry. Capillary Refill: Capillary refill takes less than 2 seconds. Neurological:      General: No focal deficit present. Mental Status: She is alert and oriented to person, place, and time. Psychiatric:         Mood and Affect: Mood normal.         Behavior: Behavior normal.         Thought Content: Thought content normal.         Judgment: Judgment normal.          MDM  Number of Diagnoses or Management Options  Abdominal pain, right lower quadrant  Acute appendicitis with localized peritonitis, without perforation, abscess, or gangrene  Cyst of right ovary  Diagnosis management comments: 24-year-old female who presents emergency department today with complaint of lower abdominal pain. Bowel sounds active in all 4 quadrants. Abdomen is soft. Patient has some tenderness on right lower abdomen near umbilicus. Urine is negative for pregnancy or indication of UTI. Mild leukocytosis with WBC 14.3. Labs otherwise unremarkable. CT concerning for early acute appendicitis. She also has a noted cystic mass on her right ovary. Patient did not have any tenderness with palpation to pelvis. I spoke with Dr. Ga Meraz with general surgery. I did offer to send patient for a pelvic ultrasound to rule out torsion but he states he will get the patient set up for an appendectomy. I have discussed results with the patient. She is agreeable to surgery today. Patient has been kept n.p.o. while in the emergency department. Given IV Zosyn. Amount and/or Complexity of Data Reviewed  Clinical lab tests: reviewed  Tests in the radiology section of CPT®: reviewed  Discuss the patient with other providers: yes (Dr. Marck Edmonds )      ED Course as of Oct 05 1738   Tue Oct 05, 2021   1643 IMPRESSION  1. Findings concerning for early acute appendicitis as described above. No free  air or abscess is currently seen.     2. Large cystic mass measuring 16.3 cm x 9.7 cm x 18.5 cm arising from the right  adnexa likely representing a a large functional ovarian cyst particularly in  this young patient. No acute features are clearly seen. However, the size of  this could easily predispose the right ovary to torsion. Signs/symptoms of acute  torsion should be excluded. If these subsequently arise then these should be  assessed with imaging at that time. Given the size of this, a follow-up pelvic  ultrasound in 6 weeks would be recommended to ensure resolution. This can be  performed sooner if the patient's develops acute pelvic symptoms. CT ABD PELV W CONT [BC]   4100 Results of CT scan discussed with the patient. Patient is agreeable to general surgery consulted. I have also discussed with her the cyst noted on her right ovary. Patient states she does not have a GYN. Will provide her with referral for follow-up. She is aware that she needs to have an outpatient ultrasound done in about 6 weeks to reassess the cyst.  She has been educated on signs of ovarian torsion. [BC]   6291 I spoke with ANGELA Aguilar with surgery. She states she will let Dr. Susan Edmonds know.      [BC]      ED Course User Index  [BC] Zari Hernandez NP     Recent Results (from the past 8 hour(s))   CBC WITH AUTOMATED DIFF    Collection Time: 10/05/21  2:27 PM   Result Value Ref Range    WBC 14.3 (H) 4.3 - 11.1 K/uL    RBC 4.32 4.05 - 5.2 M/uL    HGB 12.6 11.7 - 15.4 g/dL    HCT 38.8 35.8 - 46.3 %    MCV 89.8 79.6 - 97.8 FL    MCH 29.2 26.1 - 32.9 PG    MCHC 32.5 31.4 - 35.0 g/dL    RDW 12.8 11.9 - 14.6 %    PLATELET 223 877 - 295 K/uL    MPV 10.1 9.4 - 12.3 FL    ABSOLUTE NRBC 0.00 0.0 - 0.2 K/uL    DF AUTOMATED      NEUTROPHILS 71 43 - 78 %    LYMPHOCYTES 22 13 - 44 %    MONOCYTES 5 4.0 - 12.0 %    EOSINOPHILS 2 0.5 - 7.8 %    BASOPHILS 1 0.0 - 2.0 %    IMMATURE GRANULOCYTES 1 0.0 - 5.0 %    ABS. NEUTROPHILS 10.2 (H) 1.7 - 8.2 K/UL    ABS. LYMPHOCYTES 3.1 0.5 - 4.6 K/UL    ABS. MONOCYTES 0.7 0.1 - 1.3 K/UL    ABS. EOSINOPHILS 0.3 0.0 - 0.8 K/UL    ABS. BASOPHILS 0.1 0.0 - 0.2 K/UL    ABS. IMM. GRANS. 0.1 0.0 - 0.5 K/UL   METABOLIC PANEL, COMPREHENSIVE    Collection Time: 10/05/21  2:27 PM   Result Value Ref Range    Sodium 140 136 - 145 mmol/L    Potassium 3.3 (L) 3.5 - 5.1 mmol/L    Chloride 105 98 - 107 mmol/L    CO2 28 21 - 32 mmol/L    Anion gap 7 7 - 16 mmol/L    Glucose 69 65 - 100 mg/dL    BUN 11 6 - 23 MG/DL    Creatinine 0.73 0.6 - 1.0 MG/DL    GFR est AA >60 >60 ml/min/1.73m2    GFR est non-AA >60 >60 ml/min/1.73m2    Calcium 9.5 8.3 - 10.4 MG/DL    Bilirubin, total 0.8 0.2 - 1.1 MG/DL    ALT (SGPT) 42 12 - 65 U/L    AST (SGOT) 18 15 - 37 U/L    Alk.  phosphatase 80 50 - 136 U/L    Protein, total 8.1 6.3 - 8.2 g/dL    Albumin 4.0 3.5 - 5.0 g/dL    Globulin 4.1 (H) 2.3 - 3.5 g/dL    A-G Ratio 1.0 (L) 1.2 - 3.5     LIPASE    Collection Time: 10/05/21  2:27 PM   Result Value Ref Range    Lipase 104 73 - 393 U/L   HCG URINE, QL. - POC    Collection Time: 10/05/21  2:51 PM   Result Value Ref Range    Pregnancy test,urine (POC) Negative NEG         Procedures

## 2021-10-05 NOTE — H&P
History and Physical      Julee Maloney:   Physician:  Oliva Dunbar. Alt, DO    This 23 y.o. female presents today with increased belly pain. History: This 23 y.o. G0  patient has history of 2 months of ab pain that has been dull but today increased. She denies fever. Has some nausea. She is accompanied by her sister. She rates her abdominal pain a \"5\" on scale of 1-10. Was called by Dr. Suhail Cisneros as he was consulted regarding possible appendicitis. CT showed right ovarian cyst thought to be functional 18-20cm. OB History    No obstetric history on file. Gyn hx:  Monthly cycles. No pap or stis. coitarche age 23 with different partners. History reviewed. No pertinent past medical history. has no past surgical history on file. Current Facility-Administered Medications   Medication Dose Route Frequency    sodium chloride (NS) flush 5-10 mL  5-10 mL IntraVENous Q8H    sodium chloride (NS) flush 5-10 mL  5-10 mL IntraVENous PRN    lidocaine (XYLOCAINE) 10 mg/mL (1 %) injection 0.1 mL  0.1 mL SubCUTAneous PRN    lactated Ringers infusion  25 mL/hr IntraVENous CONTINUOUS    fentaNYL citrate (PF) injection 100 mcg  100 mcg IntraVENous ONCE    midazolam (VERSED) injection 2 mg  2 mg IntraVENous ONCE PRN    midazolam (VERSED) injection 2 mg  2 mg IntraVENous ONCE    naloxone (NARCAN) injection 0.2 mg  0.2 mg IntraVENous Multiple    ondansetron (ZOFRAN) injection 4 mg  4 mg IntraVENous ONCE PRN       No Known Allergies. reports that she has never smoked. She does not have any smokeless tobacco history on file. She reports that she does not drink alcohol and does not use drugs. She works at [employer].    family history is not on file. Physical Exam:    Vitals:    10/05/21 1425   BP: 122/83   Pulse: 87   Resp: 17   Temp: 98 °F (36.7 °C)   SpO2: 98%   Weight: 150 lb (68 kg)   Height: 5' 2\" (1.575 m)       Patient without distress.   Heart: Regular rate and rhythm   Lung: clear to auscultation throughout lung fields, no wheezes, no rales, no rhonchi and normal respiratory effort  Abdomen: soft, mildly tender with abdominal mass well above the umbilicus on right side. No rebound or guarding. Lower Extremities:  - Edema No    Findings/Diagnosis:   Pre-Op Evaluation done today. Large right ovarian cyst.  Possible appendicitis. Surgery to be Performed:  laparoscopy with possible right ovarian cystectomy versus salpingoopheretomy and possible laparotomy   Surgery Date:    Surgery Place:  [facility]  Surgery Duration:  1 hour  Surgery Type:  Assistant Surgeon: The risks of surgery were discussed in detail, including anesthesia, hemorrhage, blood clots, infection, damage to other organs such as bowel. Also the possible need for catheter use at home after surgery. Time away from work and physical restrictions discussed. Also the possible need for catheter use at home after surgery. Pt understands that complications aren't anticipated but that if they should occur than corrective procedures would be performed as indicated including, but not limited to, need for: transfusion, antibiotics, and additional surgical procedures including modification/extension of incision (possible vertical incision), colostomy, reimplantation of ureters, need for prolonged catheter drainage should urologic injury occur & other procedures as indicated. Unanticipated reactions to anesthesia as well as the small possibility of death were all discussed. All of the patient's questions/concerns were answered. She was encouraged to call me if she has additional questions/concerns prior to surgery. Pt understands & states she wants to proceed w/surgery.

## 2021-10-06 NOTE — ANESTHESIA POSTPROCEDURE EVALUATION
Procedure(s):  APPENDECTOMY LAPAROSCOPIC  Ovarian Cyst Excision Laparoscopic.    general    Anesthesia Post Evaluation      Multimodal analgesia: multimodal analgesia used between 6 hours prior to anesthesia start to PACU discharge  Patient location during evaluation: bedside  Patient participation: complete - patient participated  Level of consciousness: awake and alert  Pain score: 2  Pain management: adequate  Airway patency: patent  Anesthetic complications: no  Cardiovascular status: acceptable  Respiratory status: acceptable  Hydration status: acceptable  Comments: Pt doing well. Ok to d/c home.    Post anesthesia nausea and vomiting:  controlled  Final Post Anesthesia Temperature Assessment:  Normothermia (36.0-37.5 degrees C)      INITIAL Post-op Vital signs:   Vitals Value Taken Time   /58 10/05/21 2150   Temp 36.7 °C (98.1 °F) 10/05/21 2150   Pulse 65 10/05/21 2150   Resp 18 10/05/21 2150   SpO2 97 % 10/05/21 2150

## 2021-10-06 NOTE — OP NOTES
Operative Report    Date of Surgery: 10/5/2021     Preoperative Diagnosis: appenticitis/ovarian cyst     Postoperative Diagnosis: appenticitis/ovarian cyst     Surgeon(s) and Role:  Panel 1:     * John Cherry, DO - Primary  Panel 2:     * Tiago Wiley, DO - Primary    Anesthesia: General     Intravenous Fluids:  1000cc    Urinary Output:  550 cc clear yellow    Procedure: Procedure(s):  APPENDECTOMY LAPAROSCOPIC  Ovarian Cyst Excision Laparoscopic     Estimated Blood Loss:       Specimens:   ID Type Source Tests Collected by Time Destination   1 : RIGHT OVARIAN CYST WALL Fresh Ovarian Cyst,Right  Yesica Caul, DO 10/5/2021 2015 Pathology   2 : APPENDIX Preservative Appendix  Yesica Caul, DO 10/5/2021 2016 Pathology   1 : OVARAIN CYST FLUID Fresh Ovarian Cyst  Inez Elza K, DO 10/5/2021 1945 Cytology         Findings: normal uterus  And 20cm right  ovarian cyst with clear fluid and smaller 1cm cyst also with clear fluid. Grossly normal ovary on left and tube. Normal tube on right. Procedure in Detail:   After thorough counseling and consent, the patient was taken to operative suite where she was placed in the supine position and underwent general endotracheal anesthesia without incident. She was prepped with Betadine solution on the abdomen was draped in the usual sterile fashion. A Moss catheter was placed transurethrally to bag drainage. Ashly entry was obtained via Dr. Errol Limon and CO2 gas used to insufflate the abdomen. See his note for this distal to xiphoid. He then placed 5mm port luq. Next I placed a 5mm port in ruq. The ovary was inspected and harmonic scalpel was used to make a 7mm opening with clear fluid drained into the cannister with suction. Next the opening was extended and part of cyst was was removed with excellent hemostasis using the harmonic scalpel. Attention was then turned to the appendectomy portion of the surgery.   See Dr. Errol Limon note for this. Ovary and tube were again inspected and cyst bed and found to be hemostatic. The alfaro was closed in standard fashion by Dr. Claudette Sinclair and I closed ruq port in subcuticular fashion with 3-0 vicryl. The patient was awakened from general endotracheal anesthesia and transferred to the Postanesthesia Care Unit in good condition. She tolerated the procedure well.      Signed By:  Susan Thacker DO     October 5, 2021

## 2021-10-06 NOTE — PERIOP NOTES
To whom it may concern: This is to certify Jessica Paredes is excused from (school/work) on the following dates 10/5 & 6/2021  Please feel free to contact Dr. Gilmer Aguilar with any questions or concerns. Thank you for your assistance in this matter. Sincerely,    Prince Guerrero.  Kareem CUNNING[place].  [phone]

## 2021-10-06 NOTE — BRIEF OP NOTE
Brief Postoperative Note    Patient: Eda Valle  YOB: 2000  MRN: [id number]    Date of Procedure: 10/5/2021     Pre-Op Diagnosis: appenticitis/ovarian cyst    Post-Op Diagnosis: Same as preoperative diagnosis. Procedure(s):  APPENDECTOMY LAPAROSCOPIC CPT [id number]  Ovarian Cyst Excision Laparoscopic CPT 04103    Surgeon(s):  Vashti Wiley DO DePriest, Annette Maxin, DO    Surgical Assistant: None    Anesthesia: General     Estimated Blood Loss (mL): Minimal    Complications: None    Specimens:   ID Type Source Tests Collected by Time Destination   1 : RIGHT OVARIAN CYST WALL Fresh Ovarian Cyst,Right  Mai Gomez DO 10/5/2021 2015 Pathology   2 : APPENDIX Preservative Appendix  Mai Gomez DO 10/5/2021 2016 Pathology   1 : OVARAIN CYST FLUID Fresh Ovarian Cyst  Elza Wiley DO 10/5/2021 1945 Cytology        Implants: * No implants in log *    Drains:   Orogastric Tube 10/05/21 (Active)       Findings: 20 cm cyst with laparoscopic drainage and removal co-surgeon with Dr. Kimi Gillis. Laparoscopic appendectomy performed for acute focal appendicitis.     Electronically Signed by José Juarez DO on 10/5/2021 at 8:39 PM  [id number]

## 2021-10-06 NOTE — OP NOTES
[address] [place] [address]  OPERATIVE REPORT    Name:  Cheikh Saucedo  MR#:  [id number]  :  2000  ACCOUNT #:  [de-identified]  DATE OF SERVICE:  10/05/2021    PREOPERATIVE DIAGNOSES:  1. Acute appendicitis. 2.  20 cm right ovarian cyst.    POSTOPERATIVE DIAGNOSES:  1. Acute appendicitis. 2.  20 cm right ovarian cyst.    PROCEDURES PERFORMED:  1. Laparoscopic appendectomy, CPT code 53055.  2.  Ovarian cyst excision, laparoscopic, CPT code 17177-65-26-67. SURGEON:  John Traylor DO    ASSISTANT:  Lin Wiley DO    ANESTHESIA:  General endotracheal.    COMPLICATIONS:  None. SPECIMENS REMOVED:  1. Right ovarian cyst wall. 2.  Appendix. 3.  Ovarian cyst fluid. IMPLANTS:  None. ESTIMATED BLOOD LOSS:  Minimal.    CONDITION:  Stable. COUNTS:  Sponge count, needle count, instrument count all correct x3. PROCEDURE:  This is a 17-year-old female with acute appendicitis on CAT scan as well as a 20 cm ovarian cyst on the right side. She was prepared for a laparoscopic appendectomy and drainage of ovarian cyst by Dr. Blayne Randall. Both surgeons were present at the beginning of the case. Consent was obtained by describing the procedure to the patient including potential complications to include infection, bleeding, possible oophorectomy. Consent was obtained and placed in the final chart. She was administered Zosyn 3.375 mg IV preoperatively. She was taken to the operative suite, placed in a supine position and general anesthesia was initiated without complications. She was then prepped and draped in a sterile fashion and a time-out was taken to confirm the patient's name and proper procedure. Following this, an epigastric incision was planned. A 0.25% Marcaine with epinephrine was used to anesthetize the skin and subcutaneous tissue and then a #11 scalpel blade was used to make a skin incision. Bovie cauterization was used to dissect down in the rectus fascia.   The fascia was then incised and 0 Vicryl sutures were placed as anchoring stitches. The peritoneum was elevated between tonsil of Schnidt, entered with Metzenbaum scissors. A Ashly trocar was placed in the peritoneum, secured into place and a CO2 gas was used to create a pneumoperitoneum at 15 mmHg. A laparoscope was passed into the abdomen and brief survey revealed a large ovarian cyst in the central portion of the abdomen. We placed 5-mm trocars in the right upper quadrant under direct visualization, left upper quadrant under direct visualization and left lower quadrant under direct visualization. No evidence of bowel injury. Once again Dr. Clarence Villegas and Dr. Ale Cuellar were present through the entire case. We addressed the cyst to improve visualization initially. At this point, a Harmonic scalpel was used by Dr. Ale Cuellar to incise the cyst and then a suction tip was placed into the cyst cavity and the cyst fluid was evacuated. This would later be sent to Pathology for analysis. Once the cyst fluid was evacuated, we were able to the survey the abdomen revealing an acute focal appendicitis in the right lower quadrant and no other significant findings. At this point, we performed a cyst wall excision and I was the co-surgeon on the case working with Dr. Ale Cuellar and as she held retraction on the cyst cavity, the Harmonic scalpel was used to remove one wall of the cyst cavity. This will be removed and sent to Pathology for analysis. At this point, we copiously irrigated with saline until clear. We visualized the right ovary to be intact despite the large cyst.  There was an additional cyst that was also drained. The left ovary was normal in appearance. The uterus was normal in appearance. We copiously irrigated with saline until clear and then turned our attention to the appendix. The appendix was then elevated in the right lower quadrant and then a Harmonic scalpel was used to take down the mesoappendix to the base.   The base was then divided using a blue AILYN stapler. The appendix was then placed into an EndoCatch bag, as well as the cyst wall and these were retrieved and sent to Pathology for analysis. At this point, we readmitted the scope, we copiously irrigated with saline until clear, ensured hemostasis on the base of the appendix. Once hemostasis was confirmed, we irrigated the right upper quadrant above the liver and evacuated all fluid from this hepatic recess, reinspected the appendiceal base to be hemostatic, irrigated the pelvis to be clear, reinspected the ovary and then concluded the case. All trocars were removed in the usual fashion without evidence of bleeding. The periumbilical port site was closed with 0 Vicryl in a figure-of-eight fashion, irrigated once again, closing the skin edges with 3-0 Vicryl in a simple running fashion. Mastisol and Steri-Strips placed up the incision. Sterile dressing applied. The patient will be extubated and  transferred to Recovery stable. FINDINGS:  A 28-year-old female with acute appendicitis and a 20-cm cyst.  A laparoscopic appendectomy was performed for acute focal appendicitis. No evidence of rupture. Drainage of a cyst as co-surgeons on the case with Dr. Jayne Seth and removal of the cyst wall using Harmonic scalpel was performed without immediate complications. There was minimal bleeding. She tolerated the procedure well.       [address], DO      [id number]  D:  10/05/2021 20:49  T:  10/06/2021 7:11  JOB #:  [id number]

## 2021-10-06 NOTE — DISCHARGE INSTRUCTIONS
Your Recovery  Your doctor removed your appendix either by making many small cuts, called incisions, in your belly or laparoscopic surgery. The incisions leave scars that usually fade over time. After your surgery, it is normal to feel weak and tired for several days after you return home. Your belly may be swollen and may be painful. You had laparoscopic surgery, you may have pain in your shoulder for about 24 hours. You may also feel sick to your stomach and have diarrhea, constipation, gas, or a headache. This usually goes away in a few days. You had laparoscopic surgery, you will probably be able to return to work or a normal routine 1 to 3 weeks after surgery. If your appendix ruptured, you may have a drain in your incision. Your body will work fine without an appendix. You will not have to make any changes in your diet or lifestyle. This care sheet gives you a general idea about how long it will take for you to recover. But each person recovers at a different pace. Follow the steps below to get better as quickly as possible. How can you care for yourself at home? Activity  · Rest when you feel tired. Getting enough sleep will help you recover. · Try to walk each day. Start by walking a little more than you did the day before. Bit by bit, increase the amount you walk. Walking boosts blood flow and helps prevent pneumonia and constipation. · For about 2 weeks, avoid lifting anything that would make you strain. This may include a child, heavy grocery bags and milk containers, a heavy briefcase or backpack, cat litter or dog food bags, or a vacuum . · Avoid strenuous activities, such as bicycle riding, jogging, weight lifting, or aerobic exercise, until your doctor says it is okay. · You may be able to take showers (unless you have a drain near your incision) 24 to 48 hours after surgery. Pat the incision dry. Do not take a bath for the first 2 weeks, or until your doctor tells you it is okay. If you have a drain near your incision, follow your doctor's instructions. · You may drive when you are no longer taking pain medicine and can quickly move your foot from the gas pedal to the brake. You must also be able to sit comfortably for a long period of time, even if you do not plan on going far. You might get caught in traffic. · You will probably be able to go back to work in 1 to 3 weeks. If you had an open surgery, it may take 3 to 4 weeks. · Your doctor will tell you when you can have sex again. Diet  · You can eat your normal diet. If your stomach is upset, try bland, low-fat foods like plain rice, broiled chicken, toast, and yogurt. · Drink plenty of fluids (unless your doctor tells you not to). · You may notice that your bowel movements are not regular right after your surgery. This is common. Try to avoid constipation and straining with bowel movements. You may want to take a fiber supplement every day. If you have not had a bowel movement after a couple of days, ask your doctor about taking a mild laxative. Medicines  · Your doctor will tell you if and when you can restart your medicines. He or she will also give you instructions about taking any new medicines. · If you take blood thinners, such as warfarin (Coumadin), clopidogrel (Plavix), or aspirin, be sure to talk to your doctor. He or she will tell you if and when to start taking those medicines again. Make sure that you understand exactly what your doctor wants you to do. · If your appendix ruptured, you will need to take antibiotics. Take them as directed. Do not stop taking them just because you feel better. You need to take the full course of antibiotics. · Be safe with medicines. Take pain medicines exactly as directed. ¨ If the doctor gave you a prescription medicine for pain, take it as prescribed.   ¨ If you are not taking a prescription pain medicine, take an over-the-counter medicine such as acetaminophen (Tylenol), ibuprofen (Advil, Motrin), or naproxen (Aleve). Read and follow all instructions on the label. ¨ Do not take two or more pain medicines at the same time unless the doctor told you to. Many pain medicines have acetaminophen, which is Tylenol. Too much Tylenol can be harmful. · If you think your pain medicine is making you sick to your stomach:  ¨ Take your medicine after meals (unless your doctor has told you not to). ¨ Ask your doctor for a different pain medicine. Medication Interaction:  During your procedure you potentially received a medication or medications which may reduce the effectiveness of oral contraceptives. Please consider other forms of contraception for 1 month following your procedure if you are currently using oral contraceptives as your primary form of birth control. In addition to this, we recommend continuing your oral contraceptive as prescribed, unless otherwise instructed by your physician, during this time. Incision care  · If you had an open surgery, you may have staples in your incision. The doctor will take these out in 7 to 10 days. · If you have strips of tape on the incision, leave the tape on for a week or until it falls off. · You may wash the area with warm, soapy water 24 to 48 hours after your surgery, unless your doctor tells you not to. Pat the area dry. · Keep the area clean and dry. You may cover it with a gauze bandage if it weeps or rubs against clothing. Change the bandage every day. Follow-up care is a key part of your treatment and safety. Be sure to make and go to all appointments, and call your doctor if you are having problems. It's also a good idea to know your test results and keep a list of the medicines you take. When should you call for help? Call 911 anytime you think you may need emergency care. For example, call if:  · You passed out (lost consciousness). · You have sudden chest pain and shortness of breath, or you cough up blood.   · You have severe trouble breathing. Call your doctor now or seek immediate medical care if:  · You are sick to your stomach and cannot drink fluids. · You have severe diarrhea. · You have pain that does not get better when you take your pain medicine. · You have signs of infection, such as:  ¨ Increased pain, swelling, warmth, or redness. ¨ Red streaks leading from the wound. ¨ Pus draining from the wound. ¨ A fever. · You have loose stitches, or your incision comes open. · Bright red blood has soaked through a large bandage. · You have signs of a blood clot, such as:  ¨ Pain in your calf, back of knee, thigh, or groin. ¨ Redness and swelling in your leg or groin. Watch closely for any changes in your health, and be sure to contact your doctor if:  · You had a laparoscopic surgery and your shoulder pain lasts more than 24 hours. · The amount of drainage suddenly increases, or the color and texture changes. You do not have a bowel movement after taking a laxative. After general anesthesia or intravenous sedation, for 24 hours or while taking prescription Narcotics:  · Limit your activities  · A responsible adult needs to be with you for the next 24 hours  · Do not drive and operate hazardous machinery  · Do not make important personal or business decisions  · Do not drink alcoholic beverages  · If you have not urinated within 8 hours after discharge, and you are experiencing discomfort from urinary retention, please go to the nearest ED. · If you have sleep apnea and have a CPAP machine, please use it for all naps and sleeping. · Please use caution when taking narcotics and any of your home medications that may cause drowsiness. *  Please give a list of your current medications to your Primary Care Provider. *  Please update this list whenever your medications are discontinued, doses are      changed, or new medications (including over-the-counter products) are added.   *  Please carry medication information at all times in case of emergency situations. These are general instructions for a healthy lifestyle:  No smoking/ No tobacco products/ Avoid exposure to second hand smoke  Surgeon General's Warning:  Quitting smoking now greatly reduces serious risk to your health. Obesity, smoking, and sedentary lifestyle greatly increases your risk for illness  A healthy diet, regular physical exercise & weight monitoring are important for maintaining a healthy lifestyle    You may be retaining fluid if you have a history of heart failure or if you experience any of the following symptoms:  Weight gain of 3 pounds or more overnight or 5 pounds in a week, increased swelling in our hands or feet or shortness of breath while lying flat in bed. Please call your doctor as soon as you notice any of these symptoms; do not wait until your next office visit.

## 2021-10-11 NOTE — PROGRESS NOTES
Pathology from ovarian cyst  -serous cystadenoma. Fluid was negative inside cyst.  Appendicitis.   Will discuss follow up at postop visit

## 2021-10-20 PROBLEM — Z90.49 S/P APPENDECTOMY: Status: ACTIVE | Noted: 2021-10-20

## 2022-01-31 ENCOUNTER — APPOINTMENT (OUTPATIENT)
Dept: ULTRASOUND IMAGING | Age: 22
End: 2022-01-31
Attending: EMERGENCY MEDICINE

## 2022-01-31 ENCOUNTER — APPOINTMENT (OUTPATIENT)
Dept: CT IMAGING | Age: 22
End: 2022-01-31
Attending: EMERGENCY MEDICINE

## 2022-01-31 ENCOUNTER — HOSPITAL ENCOUNTER (EMERGENCY)
Age: 22
Discharge: HOME | End: 2022-01-31
Attending: EMERGENCY MEDICINE

## 2022-01-31 VITALS
HEART RATE: 87 BPM | TEMPERATURE: 97.8 F | BODY MASS INDEX: 28.7 KG/M2 | SYSTOLIC BLOOD PRESSURE: 108 MMHG | DIASTOLIC BLOOD PRESSURE: 70 MMHG | RESPIRATION RATE: 16 BRPM | OXYGEN SATURATION: 100 % | WEIGHT: 152 LBS | HEIGHT: 61 IN

## 2022-01-31 DIAGNOSIS — N83.201 CYST OF RIGHT OVARY: ICD-10-CM

## 2022-01-31 DIAGNOSIS — N30.00 ACUTE CYSTITIS WITHOUT HEMATURIA: Primary | ICD-10-CM

## 2022-01-31 LAB
ALBUMIN SERPL-MCNC: 3.5 G/DL (ref 3.5–5)
ALBUMIN/GLOB SERPL: 0.9 {RATIO} (ref 1.2–3.5)
ALP SERPL-CCNC: 92 U/L (ref 50–136)
ALT SERPL-CCNC: 31 U/L (ref 12–65)
ANION GAP SERPL CALC-SCNC: 5 MMOL/L (ref 7–16)
AST SERPL-CCNC: 22 U/L (ref 15–37)
BACTERIA URNS QL MICRO: ABNORMAL /HPF
BASOPHILS # BLD: 0.1 K/UL (ref 0–0.2)
BASOPHILS NFR BLD: 1 % (ref 0–2)
BILIRUB SERPL-MCNC: 0.4 MG/DL (ref 0.2–1.1)
BILIRUB UR QL: NEGATIVE
BUN SERPL-MCNC: 10 MG/DL (ref 6–23)
CALCIUM SERPL-MCNC: 8.9 MG/DL (ref 8.3–10.4)
CASTS URNS QL MICRO: ABNORMAL /LPF
CHLORIDE SERPL-SCNC: 108 MMOL/L (ref 98–107)
CO2 SERPL-SCNC: 25 MMOL/L (ref 21–32)
CREAT SERPL-MCNC: 0.65 MG/DL (ref 0.6–1)
DIFFERENTIAL METHOD BLD: NORMAL
EOSINOPHIL # BLD: 0.3 K/UL (ref 0–0.8)
EOSINOPHIL NFR BLD: 4 % (ref 0.5–7.8)
EPI CELLS #/AREA URNS HPF: ABNORMAL /HPF
ERYTHROCYTE [DISTWIDTH] IN BLOOD BY AUTOMATED COUNT: 12.4 % (ref 11.9–14.6)
GLOBULIN SER CALC-MCNC: 3.9 G/DL (ref 2.3–3.5)
GLUCOSE SERPL-MCNC: 102 MG/DL (ref 65–100)
GLUCOSE UR QL STRIP.AUTO: NEGATIVE MG/DL
HCG UR QL: NEGATIVE
HCT VFR BLD AUTO: 40.3 % (ref 35.8–46.3)
HGB BLD-MCNC: 13.1 G/DL (ref 11.7–15.4)
IMM GRANULOCYTES # BLD AUTO: 0.1 K/UL (ref 0–0.5)
IMM GRANULOCYTES NFR BLD AUTO: 1 % (ref 0–5)
KETONES UR-MCNC: NEGATIVE MG/DL
LEUKOCYTE ESTERASE UR QL STRIP: NEGATIVE
LIPASE SERPL-CCNC: 140 U/L (ref 73–393)
LYMPHOCYTES # BLD: 3.4 K/UL (ref 0.5–4.6)
LYMPHOCYTES NFR BLD: 39 % (ref 13–44)
MCH RBC QN AUTO: 29.4 PG (ref 26.1–32.9)
MCHC RBC AUTO-ENTMCNC: 32.5 G/DL (ref 31.4–35)
MCV RBC AUTO: 90.4 FL (ref 79.6–97.8)
MONOCYTES # BLD: 0.5 K/UL (ref 0.1–1.3)
MONOCYTES NFR BLD: 6 % (ref 4–12)
NEUTS SEG # BLD: 4.4 K/UL (ref 1.7–8.2)
NEUTS SEG NFR BLD: 50 % (ref 43–78)
NITRITE UR QL: POSITIVE
NRBC # BLD: 0 K/UL (ref 0–0.2)
PH UR: 7 [PH] (ref 5–9)
PLATELET # BLD AUTO: 301 K/UL (ref 150–450)
PMV BLD AUTO: 10.1 FL (ref 9.4–12.3)
POTASSIUM SERPL-SCNC: 3.9 MMOL/L (ref 3.5–5.1)
PROT SERPL-MCNC: 7.4 G/DL (ref 6.3–8.2)
PROT UR QL: NEGATIVE MG/DL
RBC # BLD AUTO: 4.46 M/UL (ref 4.05–5.2)
RBC # UR STRIP: ABNORMAL /UL
RBC #/AREA URNS HPF: ABNORMAL /HPF
SERVICE CMNT-IMP: ABNORMAL
SODIUM SERPL-SCNC: 138 MMOL/L (ref 136–145)
SP GR UR: >1.03 (ref 1–1.02)
UROBILINOGEN UR QL: 0.2 EU/DL (ref 0.2–1)
WBC # BLD AUTO: 8.8 K/UL (ref 4.3–11.1)
WBC URNS QL MICRO: ABNORMAL /HPF

## 2022-01-31 PROCEDURE — 74177 CT ABD & PELVIS W/CONTRAST: CPT

## 2022-01-31 PROCEDURE — 80053 COMPREHEN METABOLIC PANEL: CPT

## 2022-01-31 PROCEDURE — 81003 URINALYSIS AUTO W/O SCOPE: CPT

## 2022-01-31 PROCEDURE — 74011000258 HC RX REV CODE- 258: Performed by: EMERGENCY MEDICINE

## 2022-01-31 PROCEDURE — 81025 URINE PREGNANCY TEST: CPT

## 2022-01-31 PROCEDURE — 85025 COMPLETE CBC W/AUTO DIFF WBC: CPT

## 2022-01-31 PROCEDURE — 83690 ASSAY OF LIPASE: CPT

## 2022-01-31 PROCEDURE — 76856 US EXAM PELVIC COMPLETE: CPT

## 2022-01-31 PROCEDURE — 81015 MICROSCOPIC EXAM OF URINE: CPT

## 2022-01-31 PROCEDURE — 74011250637 HC RX REV CODE- 250/637: Performed by: EMERGENCY MEDICINE

## 2022-01-31 PROCEDURE — 74011000636 HC RX REV CODE- 636: Performed by: EMERGENCY MEDICINE

## 2022-01-31 PROCEDURE — 99284 EMERGENCY DEPT VISIT MOD MDM: CPT

## 2022-01-31 RX ORDER — SODIUM CHLORIDE 0.9 % (FLUSH) 0.9 %
5-10 SYRINGE (ML) INJECTION EVERY 8 HOURS
Status: DISCONTINUED | OUTPATIENT
Start: 2022-01-31 | End: 2022-01-31 | Stop reason: HOSPADM

## 2022-01-31 RX ORDER — MELOXICAM 15 MG/1
15 TABLET ORAL DAILY
Qty: 15 TABLET | Refills: 0 | Status: SHIPPED | OUTPATIENT
Start: 2022-01-31 | End: 2022-03-02

## 2022-01-31 RX ORDER — SODIUM CHLORIDE 0.9 % (FLUSH) 0.9 %
10 SYRINGE (ML) INJECTION
Status: COMPLETED | OUTPATIENT
Start: 2022-01-31 | End: 2022-01-31

## 2022-01-31 RX ORDER — MELOXICAM 7.5 MG/1
15 TABLET ORAL
Status: COMPLETED | OUTPATIENT
Start: 2022-01-31 | End: 2022-01-31

## 2022-01-31 RX ORDER — SODIUM CHLORIDE 0.9 % (FLUSH) 0.9 %
5-10 SYRINGE (ML) INJECTION AS NEEDED
Status: DISCONTINUED | OUTPATIENT
Start: 2022-01-31 | End: 2022-01-31 | Stop reason: HOSPADM

## 2022-01-31 RX ORDER — CEPHALEXIN 500 MG/1
500 CAPSULE ORAL 2 TIMES DAILY
Qty: 14 CAPSULE | Refills: 0 | Status: SHIPPED | OUTPATIENT
Start: 2022-01-31 | End: 2022-02-07

## 2022-01-31 RX ADMIN — Medication 10 ML: at 10:17

## 2022-01-31 RX ADMIN — MELOXICAM 15 MG: 7.5 TABLET ORAL at 15:07

## 2022-01-31 RX ADMIN — IOPAMIDOL 100 ML: 755 INJECTION, SOLUTION INTRAVENOUS at 10:16

## 2022-01-31 RX ADMIN — SODIUM CHLORIDE 100 ML: 900 INJECTION, SOLUTION INTRAVENOUS at 10:17

## 2022-01-31 NOTE — ED NOTES
I have reviewed discharge instructions with the patient. The patient verbalized understanding. Patient left ED via Discharge Method: ambulatory to Home with self. Opportunity for questions and clarification provided. Patient given 2 scripts. To continue your aftercare when you leave the hospital, you may receive an automated call from our care team to check in on how you are doing. This is a free service and part of our promise to provide the best care and service to meet your aftercare needs.  If you have questions, or wish to unsubscribe from this service please call [phone]. Thank you for Choosing our [address] [place] [address] Emergency Department.

## 2022-01-31 NOTE — ED PROVIDER NOTES
Mask was worn during the entire patient examination. Evelyne Mustafa is a 19 y.o. female who presents to the ED with a chief complaint of abdominal pain. Patient mainly has pain in the upper abdominal region around a appendectomy scar. Back in October she had appendectomy and ovarian cyst removal.  I reviewed the CT from this and the cyst had huge dimensions. Patient reports no other medical problems or abdominal surgeries. She denies any STD risk factors or any concerns for this. No vaginal discharge or bleeding. Pain has been there for about 2 weeks and has been fairly constant symptoms worsened by eating. The history is provided by the patient. Abdominal Pain   Associated symptoms include nausea and vomiting. Pertinent negatives include no fever, no diarrhea, no constipation, no arthralgias and no chest pain. Past Medical History:   Diagnosis Date    Appendicitis 10/05/2021    Ovarian cyst 10/05/2021    20 cm - right ovarian cyst       Past Surgical History:   Procedure Laterality Date    HX APPENDECTOMY  10/05/2021    HX OVARIAN CYST REMOVAL  10/05/2021    ovarian cyst excision- right         No family history on file.     Social History     Socioeconomic History    Marital status: SINGLE     Spouse name: Not on file    Number of children: Not on file    Years of education: Not on file    Highest education level: Not on file   Occupational History    Not on file   Tobacco Use    Smoking status: Never Smoker    Smokeless tobacco: Never Used   Substance and Sexual Activity    Alcohol use: Never    Drug use: Never    Sexual activity: Not Currently   Other Topics Concern    Not on file   Social History Narrative    Not on file     Social Determinants of Health     Financial Resource Strain:     Difficulty of Paying Living Expenses: Not on file   Food Insecurity:     Worried About Running Out of Food in the Last Year: Not on file    Karthik of Food in the Last Year: Not on file Transportation Needs:     Lack of Transportation (Medical): Not on file    Lack of Transportation (Non-Medical): Not on file   Physical Activity:     Days of Exercise per Week: Not on file    Minutes of Exercise per Session: Not on file   Stress:     Feeling of Stress : Not on file   Social Connections:     Frequency of Communication with Friends and Family: Not on file    Frequency of Social Gatherings with Friends and Family: Not on file    Attends Zoroastrian Services: Not on file    Active Member of [address] [place] [address] or Organizations: Not on file    Attends Club or Organization Meetings: Not on file    Marital Status: Not on file   Intimate Partner Violence:     Fear of Current or Ex-Partner: Not on file    Emotionally Abused: Not on file    Physically Abused: Not on file    Sexually Abused: Not on file   Housing Stability:     Unable to Pay for Housing in the Last Year: Not on file    Number of [place] in the Last Year: Not on file    Unstable Housing in the Last Year: Not on file         ALLERGIES: Patient has no known allergies. Review of Systems   Constitutional: Negative for chills, fatigue and fever. HENT: Negative for congestion. Respiratory: Negative for cough, chest tightness, shortness of breath, wheezing and stridor. Cardiovascular: Negative for chest pain and palpitations. Gastrointestinal: Positive for abdominal pain, nausea and vomiting. Negative for abdominal distention, anal bleeding, blood in stool, constipation and diarrhea. Musculoskeletal: Negative for arthralgias, neck pain and neck stiffness. Skin: Negative for color change, rash and wound. All other systems reviewed and are negative. Vitals:    01/31/22 0801 01/31/22 0918 01/31/22 1505   BP: (!) 137/90 93/82 108/70   Pulse: 87     Resp: 16     Temp: 97.8 °F (36.6 °C)     SpO2: 97% 100% 100%   Weight: 68.9 kg (152 lb)     Height: 5' 1\" (1.549 m)              Physical Exam  Vitals and nursing note reviewed. Constitutional:       General: She is not in acute distress. Appearance: She is well-developed. She is not ill-appearing, toxic-appearing or diaphoretic. HENT:      Head: Normocephalic and atraumatic. Eyes:      General: No scleral icterus. Conjunctiva/sclera: Conjunctivae normal.   Pulmonary:      Effort: Pulmonary effort is normal. No respiratory distress. Breath sounds: No stridor. Abdominal:      General: Abdomen is flat. Tenderness: There is abdominal tenderness in the epigastric area and periumbilical area. There is no right CVA tenderness, left CVA tenderness, guarding or rebound. Negative signs include Shook's sign and Rovsing's sign. Hernia: No hernia is present. Comments: Old scar tissues. Genitourinary:     Comments: Patient declined  Musculoskeletal:      Cervical back: No rigidity. Skin:     Coloration: Skin is not jaundiced or pale. Neurological:      Mental Status: She is alert. Mental status is at baseline. Psychiatric:         Mood and Affect: Mood normal.         Behavior: Behavior normal.          MDM  Number of Diagnoses or Management Options  Acute cystitis without hematuria  Cyst of right ovary  Diagnosis management comments: Patient originally had CT scan done not see any complications from the appendectomy site. However on the CT there was some findings in the right adnexa. Clarify this with ultrasound. Does show some fluid collection. Patient is very stable and in no distress. Does agree to follow back up with the doctor she seen. Fluid collection is much smaller than when she had surgery. I am referring her back to general surgery. Momo Talley MD; 1/31/2022 @5:30 PM Voice dictation software was used during the making of this note. This software is not perfect and grammatical and other typographical errors may be present.   This note has not been proofread for errors.  ====================================        Amount and/or Complexity of Data Reviewed  Clinical lab tests: ordered and reviewed (Results for orders placed or performed during the hospital encounter of 01/31/22  -CBC WITH AUTOMATED DIFF:        Result                      Value             Ref Range           WBC                         8.8               4.3 - 11.1 K*       RBC                         4.46              4.05 - 5.2 M*       HGB                         13.1              11.7 - 15.4 *       HCT                         40.3              35.8 - 46.3 %       MCV                         90.4              79.6 - 97.8 *       MCH                         29.4              26.1 - 32.9 *       MCHC                        32.5              31.4 - 35.0 *       RDW                         12.4              11.9 - 14.6 %       PLATELET                    301               150 - 450 K/*       MPV                         10.1              9.4 - 12.3 FL       ABSOLUTE NRBC               0.00              0.0 - 0.2 K/*       DF                          AUTOMATED                             NEUTROPHILS                 50                43 - 78 %           LYMPHOCYTES                 39                13 - 44 %           MONOCYTES                   6                 4.0 - 12.0 %        EOSINOPHILS                 4                 0.5 - 7.8 %         BASOPHILS                   1                 0.0 - 2.0 %         IMMATURE GRANULOCYTES       1                 0.0 - 5.0 %         ABS. NEUTROPHILS            4.4               1.7 - 8.2 K/*       ABS. LYMPHOCYTES            3.4               0.5 - 4.6 K/*       ABS. MONOCYTES              0.5               0.1 - 1.3 K/*       ABS. EOSINOPHILS            0.3               0.0 - 0.8 K/*       ABS. BASOPHILS              0.1               0.0 - 0.2 K/*       ABS. IMM.  GRANS.            0.1               0.0 - 0.5 K/*  -METABOLIC PANEL, COMPREHENSIVE:        Result                      Value             Ref Range           Sodium 138               136 - 145 mm*       Potassium                   3.9               3.5 - 5.1 mm*       Chloride                    108 (H)           98 - 107 mmo*       CO2                         25                21 - 32 mmol*       Anion gap                   5 (L)             7 - 16 mmol/L       Glucose                     102 (H)           65 - 100 mg/*       BUN                         10                6 - 23 MG/DL        Creatinine                  0.65              0.6 - 1.0 MG*       GFR est AA                  >60               >60 ml/min/1*       GFR est non-AA              >60               >60 ml/min/1*       Calcium                     8.9               8.3 - 10.4 M*       Bilirubin, total            0.4               0.2 - 1.1 MG*       ALT (SGPT)                  31                12 - 65 U/L         AST (SGOT)                  22                15 - 37 U/L         Alk.  phosphatase            92                50 - 136 U/L        Protein, total              7.4               6.3 - 8.2 g/*       Albumin                     3.5               3.5 - 5.0 g/*       Globulin                    3.9 (H)           2.3 - 3.5 g/*       A-G Ratio                   0.9 (L)           1.2 - 3.5      -LIPASE:        Result                      Value             Ref Range           Lipase                      140               73 - 393 U/L   -URINE MICROSCOPIC:        Result                      Value             Ref Range           WBC                         0-3               0 /hpf              RBC                         0-3               0 /hpf              Epithelial cells            0-3               0 /hpf              Bacteria                    4+ (H)            0 /hpf              Casts                       0-3               0 /lpf         -POC URINE MACROSCOPIC:        Result                      Value             Ref Range           Spec. gravity (POC)         >1.030 (H)        1.001 - 1.023       pH, urine  (POC)            7.0               5.0 - 9.0           Protein (POC)               Negative          NEG mg/dL           Glucose, urine (POC)        Negative          NEG mg/dL           Ketones (POC)               Negative          NEG mg/dL           Bilirubin (POC)             Negative          NEG                 Blood (POC)                                   NEG             Trace Intact (A)       Urobilinogen (POC)          0.2               0.2 - 1.0 EU*       Nitrite (POC)               Positive (A)      NEG                 Leukocyte esterase (PO*     Negative          NEG                 Performed by                Caleb Laureano                    -HCG URINE, QL. - POC:        Result                      Value             Ref Range           Pregnancy test,urine (*     Negative          NEG           )  Tests in the radiology section of CPT®: reviewed and ordered (CT ABD PELV W CONT    Result Date: 1/31/2022  HISTORY:  upper and mid abdominal pain. Hx of appendectomy 3 months ago. COMPARISON: 10/5/2021 EXAM: CT abdomen and pelvis with iv contrast TECHNIQUE: Thin section axial CT was performed from the lung bases through the symphysis pubis during uneventful rapid bolus intravenous administration of 125 mL of Isovue 370. Oral contrast was also administered. Radiation dose reduction techniques were used for this study. Our CT scanners use one or all of the following: Automated exposure control, adjustment of the mA and/or kV according to patient size, use of iterative reconstruction. FINDINGS: The lung bases are clear. CT abdomen: The liver and spleen enhances homogeneously without discrete lesions. There is no biliary ductal dilatation. The gallbladder, pancreas and adrenal glands are normal. The kidneys enhance symmetrically. Bowel loops in the upper abdomen are normal. No definite upper abdominal lymphadenopathy seen. CT pelvis: The patient is status post appendectomy.  There is a right adnexal cyst measuring 2.8 cm with significant adjacent fat stranding. The bladder and rectum are normal. No pelvic adenopathy seen. No free air or free fluid seen within the pelvis. Bone window evaluation demonstrates no aggressive osseous lesions. 1. Status post appendectomy. 2. Right adnexal cyst with significant adjacent fat stranding. Cannot exclude pelvic inflammatory disease. Recommend correlation with pelvic sonography for further evaluation. US PELV NON OB W TV    Result Date: 1/31/2022  HISTORY: abdominal pain EXAM: Ultrasound pelvis TECHNIQUE: Real-time grayscale and color Doppler imaging is performed in the longitudinal and transverse planes. Both the transabdominal and transvaginal approaches are utilized. FINDINGS: Transabdominal: The uterus measures 7.6 cm with an endometrial stripe measuring 8 mm. No definite pelvic mass seen. Transvaginal: The right ovary measures 3 x 3.2 x 3.5 cm with a simple cyst measuring 3 cm and a right adnexal fluid collection measuring 9.2 x 4.2 x 3.8 cm. The left ovary is not seen. Simple appearing right ovarian cyst with right adnexal fluid collection.  Cannot exclude ruptured cyst. Gynecologic referral recommended.   )           Procedures

## 2022-01-31 NOTE — ED TRIAGE NOTES
Pt arrives ambulatory to triage. States pain to her appendectomy scars, surgery was in early october. Pt states this pain started a few weeks, describes as a intermittent \"pinch\". States increased nausea as well. Denies vomiting and diarrhea. NAD. Masked.

## 2022-01-31 NOTE — DISCHARGE INSTRUCTIONS
Please return if symptoms worsen. Otherwise make sure you call your gynecologist tomorrow. For close follow-up. Please have them look at your CT and ultrasound results from today and compared to last October.

## 2022-02-28 ENCOUNTER — APPOINTMENT (OUTPATIENT)
Dept: ULTRASOUND IMAGING | Age: 22
End: 2022-02-28
Attending: EMERGENCY MEDICINE

## 2022-02-28 ENCOUNTER — HOSPITAL ENCOUNTER (EMERGENCY)
Age: 22
Discharge: HOME | End: 2022-02-28
Attending: EMERGENCY MEDICINE

## 2022-02-28 VITALS
SYSTOLIC BLOOD PRESSURE: 122 MMHG | BODY MASS INDEX: 27.97 KG/M2 | RESPIRATION RATE: 16 BRPM | DIASTOLIC BLOOD PRESSURE: 83 MMHG | TEMPERATURE: 98 F | WEIGHT: 152 LBS | OXYGEN SATURATION: 100 % | HEIGHT: 62 IN | HEART RATE: 94 BPM

## 2022-02-28 DIAGNOSIS — O20.0 THREATENED MISCARRIAGE: Primary | ICD-10-CM

## 2022-02-28 DIAGNOSIS — N93.9 VAGINAL BLEEDING: ICD-10-CM

## 2022-02-28 LAB
ABO + RH BLD: NORMAL
ALBUMIN SERPL-MCNC: 3.5 G/DL (ref 3.5–5)
ALBUMIN/GLOB SERPL: 0.9 {RATIO} (ref 1.2–3.5)
ALP SERPL-CCNC: 68 U/L (ref 50–136)
ALT SERPL-CCNC: 24 U/L (ref 12–65)
ANION GAP SERPL CALC-SCNC: 4 MMOL/L (ref 7–16)
AST SERPL-CCNC: 20 U/L (ref 15–37)
BASOPHILS # BLD: 0.1 K/UL (ref 0–0.2)
BASOPHILS NFR BLD: 1 % (ref 0–2)
BILIRUB SERPL-MCNC: 0.5 MG/DL (ref 0.2–1.1)
BUN SERPL-MCNC: 11 MG/DL (ref 6–23)
CALCIUM SERPL-MCNC: 8.8 MG/DL (ref 8.3–10.4)
CHLORIDE SERPL-SCNC: 108 MMOL/L (ref 98–107)
CO2 SERPL-SCNC: 24 MMOL/L (ref 21–32)
CREAT SERPL-MCNC: 0.6 MG/DL (ref 0.6–1)
DIFFERENTIAL METHOD BLD: NORMAL
EOSINOPHIL # BLD: 0.3 K/UL (ref 0–0.8)
EOSINOPHIL NFR BLD: 3 % (ref 0.5–7.8)
ERYTHROCYTE [DISTWIDTH] IN BLOOD BY AUTOMATED COUNT: 12.9 % (ref 11.9–14.6)
GLOBULIN SER CALC-MCNC: 4 G/DL (ref 2.3–3.5)
GLUCOSE SERPL-MCNC: 103 MG/DL (ref 65–100)
HCG SERPL-ACNC: ABNORMAL MIU/ML (ref 0–6)
HCG UR QL: POSITIVE
HCT VFR BLD AUTO: 40.4 % (ref 35.8–46.3)
HGB BLD-MCNC: 13.4 G/DL (ref 11.7–15.4)
IMM GRANULOCYTES # BLD AUTO: 0.1 K/UL (ref 0–0.5)
IMM GRANULOCYTES NFR BLD AUTO: 1 % (ref 0–5)
LIPASE SERPL-CCNC: 88 U/L (ref 73–393)
LYMPHOCYTES # BLD: 2.3 K/UL (ref 0.5–4.6)
LYMPHOCYTES NFR BLD: 27 % (ref 13–44)
MCH RBC QN AUTO: 30 PG (ref 26.1–32.9)
MCHC RBC AUTO-ENTMCNC: 33.2 G/DL (ref 31.4–35)
MCV RBC AUTO: 90.4 FL (ref 79.6–97.8)
MONOCYTES # BLD: 0.5 K/UL (ref 0.1–1.3)
MONOCYTES NFR BLD: 6 % (ref 4–12)
NEUTS SEG # BLD: 5.4 K/UL (ref 1.7–8.2)
NEUTS SEG NFR BLD: 62 % (ref 43–78)
NRBC # BLD: 0 K/UL (ref 0–0.2)
PLATELET # BLD AUTO: 278 K/UL (ref 150–450)
PMV BLD AUTO: 9.9 FL (ref 9.4–12.3)
POTASSIUM SERPL-SCNC: 3.9 MMOL/L (ref 3.5–5.1)
PROT SERPL-MCNC: 7.5 G/DL (ref 6.3–8.2)
RBC # BLD AUTO: 4.47 M/UL (ref 4.05–5.2)
SERVICE CMNT-IMP: NORMAL
SODIUM SERPL-SCNC: 136 MMOL/L (ref 136–145)
WBC # BLD AUTO: 8.7 K/UL (ref 4.3–11.1)
WET PREP GENITAL: NORMAL
WET PREP GENITAL: NORMAL

## 2022-02-28 PROCEDURE — 74011250637 HC RX REV CODE- 250/637: Performed by: EMERGENCY MEDICINE

## 2022-02-28 PROCEDURE — 74011250636 HC RX REV CODE- 250/636: Performed by: EMERGENCY MEDICINE

## 2022-02-28 PROCEDURE — 87210 SMEAR WET MOUNT SALINE/INK: CPT

## 2022-02-28 PROCEDURE — 99284 EMERGENCY DEPT VISIT MOD MDM: CPT

## 2022-02-28 PROCEDURE — 87491 CHLMYD TRACH DNA AMP PROBE: CPT

## 2022-02-28 PROCEDURE — 74011000258 HC RX REV CODE- 258: Performed by: EMERGENCY MEDICINE

## 2022-02-28 PROCEDURE — 86900 BLOOD TYPING SEROLOGIC ABO: CPT

## 2022-02-28 PROCEDURE — 84702 CHORIONIC GONADOTROPIN TEST: CPT

## 2022-02-28 PROCEDURE — 76815 OB US LIMITED FETUS(S): CPT

## 2022-02-28 PROCEDURE — 83690 ASSAY OF LIPASE: CPT

## 2022-02-28 PROCEDURE — 96374 THER/PROPH/DIAG INJ IV PUSH: CPT

## 2022-02-28 PROCEDURE — 85025 COMPLETE CBC W/AUTO DIFF WBC: CPT

## 2022-02-28 PROCEDURE — 81003 URINALYSIS AUTO W/O SCOPE: CPT

## 2022-02-28 PROCEDURE — 80053 COMPREHEN METABOLIC PANEL: CPT

## 2022-02-28 PROCEDURE — 81025 URINE PREGNANCY TEST: CPT

## 2022-02-28 RX ORDER — AZITHROMYCIN 250 MG/1
1000 TABLET, FILM COATED ORAL
Status: COMPLETED | OUTPATIENT
Start: 2022-02-28 | End: 2022-02-28

## 2022-02-28 RX ORDER — SODIUM CHLORIDE 0.9 % (FLUSH) 0.9 %
5-10 SYRINGE (ML) INJECTION EVERY 8 HOURS
Status: DISCONTINUED | OUTPATIENT
Start: 2022-02-28 | End: 2022-02-28 | Stop reason: HOSPADM

## 2022-02-28 RX ORDER — AZITHROMYCIN 1 G/1
1 POWDER, FOR SUSPENSION ORAL
Status: DISCONTINUED | OUTPATIENT
Start: 2022-02-28 | End: 2022-02-28

## 2022-02-28 RX ORDER — SODIUM CHLORIDE 0.9 % (FLUSH) 0.9 %
5-10 SYRINGE (ML) INJECTION AS NEEDED
Status: DISCONTINUED | OUTPATIENT
Start: 2022-02-28 | End: 2022-02-28 | Stop reason: HOSPADM

## 2022-02-28 RX ADMIN — AZITHROMYCIN DIHYDRATE 1000 MG: 250 TABLET, FILM COATED ORAL at 16:40

## 2022-02-28 RX ADMIN — CEFTRIAXONE 500 MG: 500 INJECTION, POWDER, FOR SOLUTION INTRAMUSCULAR; INTRAVENOUS at 16:40

## 2022-02-28 RX ADMIN — SODIUM CHLORIDE 1000 ML: 900 INJECTION, SOLUTION INTRAVENOUS at 14:43

## 2022-02-28 NOTE — ED TRIAGE NOTES
Pt arrives ambulatory to triage. Reports left sided abd pain for 2 weeks, hx of ovarian cysts. Also reports nausea. Denies dysuria. LMP 2/14. NAD. Masked.

## 2022-02-28 NOTE — ED PROVIDER NOTES
18 yo F presents w/ c/o cramping LLQ pain has been intermittent over the past 2 weeks. States that pain is progressively worsened. States last menstrual cycle was January 11. States she is been having intermittent vaginal spotting. Denies vaginal discharge. Denies fever, chills, nausea, vomiting, diarrhea, constipation. Denies any vaginal discharge. States that she is sexually active. Denies any previous pregnancies. Reports history of appendicitis status post appendectomy and history of right ovarian cyst.    The history is provided by the patient. No  was used. Abdominal Pain   This is a new problem. The current episode started more than 2 days ago. The problem occurs constantly. The problem has not changed since onset. The pain is located in the LLQ. The quality of the pain is cramping. The pain is at a severity of 4/10. The pain is mild. Associated symptoms include nausea. Pertinent negatives include no anorexia, no fever, no belching, no diarrhea, no hematochezia, no melena, no vomiting, no constipation, no dysuria, no frequency, no hematuria, no headaches, no arthralgias, no myalgias, no chest pain and no back pain. The pain is worsened by palpation. The pain is relieved by nothing. Past Medical History:   Diagnosis Date    Appendicitis 10/05/2021    Ovarian cyst 10/05/2021    20 cm - right ovarian cyst       Past Surgical History:   Procedure Laterality Date    HX APPENDECTOMY  10/05/2021    HX OVARIAN CYST REMOVAL  10/05/2021    ovarian cyst excision- right         No family history on file.     Social History     Socioeconomic History    Marital status: SINGLE     Spouse name: Not on file    Number of children: Not on file    Years of education: Not on file    Highest education level: Not on file   Occupational History    Not on file   Tobacco Use    Smoking status: Never Smoker    Smokeless tobacco: Never Used   Substance and Sexual Activity    Alcohol use: Never    Drug use: Never    Sexual activity: Yes     Partners: Male     Birth control/protection: None   Other Topics Concern    Not on file   Social History Narrative    Not on file     Social Determinants of Health     Financial Resource Strain:     Difficulty of Paying Living Expenses: Not on file   Food Insecurity:     Worried About Running Out of Food in the Last Year: Not on file    Karthik of Food in the Last Year: Not on file   Transportation Needs:     Lack of Transportation (Medical): Not on file    Lack of Transportation (Non-Medical): Not on file   Physical Activity:     Days of Exercise per Week: Not on file    Minutes of Exercise per Session: Not on file   Stress:     Feeling of Stress : Not on file   Social Connections:     Frequency of Communication with Friends and Family: Not on file    Frequency of Social Gatherings with Friends and Family: Not on file    Attends Muslim Services: Not on file    Active Member of [address] [place] [address] or Organizations: Not on file    Attends Club or Organization Meetings: Not on file    Marital Status: Not on file   Intimate Partner Violence:     Fear of Current or Ex-Partner: Not on file    Emotionally Abused: Not on file    Physically Abused: Not on file    Sexually Abused: Not on file   Housing Stability:     Unable to Pay for Housing in the Last Year: Not on file    Number of [place] in the Last Year: Not on file    Unstable Housing in the Last Year: Not on file         ALLERGIES: Patient has no known allergies. 17-year-old female with history of appendicitis status post appendectomy, ovarian cyst who presents with complaint of worsening left lower quadrant abdominal cramping that is been intermittent over the past 2 weeks. Patient states that she noted intermittent vaginal spotting. States that last menstrual cycle was on January 11. Review of Systems   Constitutional: Negative for chills, diaphoresis, fatigue and fever.    HENT: Negative for congestion, rhinorrhea, sore throat, trouble swallowing and voice change. Respiratory: Negative for cough and shortness of breath. Cardiovascular: Negative for chest pain and palpitations. Gastrointestinal: Positive for abdominal pain and nausea. Negative for anorexia, blood in stool, constipation, diarrhea, hematochezia, melena and vomiting. Genitourinary: Positive for pelvic pain and vaginal bleeding. Negative for dysuria, flank pain, frequency, hematuria and vaginal discharge. Musculoskeletal: Negative for arthralgias, back pain and myalgias. Skin: Negative for rash. Neurological: Negative for dizziness, syncope, weakness, light-headedness and headaches. Hematological: Does not bruise/bleed easily. Psychiatric/Behavioral: Negative for confusion. Vitals:    02/28/22 1038   BP: (!) 127/93   Pulse: 90   Resp: 16   Temp: 98.1 °F (36.7 °C)   SpO2: 100%   Weight: 68.9 kg (152 lb)   Height: 5' 2\" (1.575 m)            Physical Exam  Vitals and nursing note reviewed. Constitutional:       Appearance: She is well-developed. HENT:      Head: Normocephalic. Mouth/Throat:      Mouth: Mucous membranes are moist.   Eyes:      Extraocular Movements: Extraocular movements intact. Pupils: Pupils are equal, round, and reactive to light. Cardiovascular:      Rate and Rhythm: Normal rate and regular rhythm. Heart sounds: Normal heart sounds. Pulmonary:      Effort: Pulmonary effort is normal.      Breath sounds: Normal breath sounds. Comments: CTAB. Abdominal:      General: Abdomen is flat. Bowel sounds are normal.      Palpations: Abdomen is soft. Comments: Soft. Mild LLQ TTP. No rebound or guarding. No CVAT. No peritoneal signs. Skin:     General: Skin is warm and dry. Capillary Refill: Capillary refill takes less than 2 seconds. Coloration: Skin is not pale. Findings: No erythema or rash. Neurological:      General: No focal deficit present. Mental Status: She is alert and oriented to person, place, and time. Motor: No weakness. MDM  Number of Diagnoses or Management Options  Vaginal bleeding: new and requires workup  Diagnosis management comments: Vital signs stable. Afebrile. UPT positive. Beta-hCG [id number]. Blood type O+. Wet prep with no yeast, Trichomonas or clue cells. Pelvic ultrasound pending. Amount and/or Complexity of Data Reviewed  Clinical lab tests: ordered and reviewed  Tests in the radiology section of CPT®: ordered and reviewed  Tests in the medicine section of CPT®: reviewed and ordered  Review and summarize past medical records: yes  Independent visualization of images, tracings, or specimens: yes    Risk of Complications, Morbidity, and/or Mortality  Presenting problems: moderate  Diagnostic procedures: moderate  Management options: moderate    Patient Progress  Patient progress: stable    ED Course as of 02/28/22 1344   Mon Feb 28, 2022   1251 Pregnancy test,urine (POC)(! ): Positive [DF]      ED Course User Index  [DF] Irineo Way MD       Pelvic Exam    Date/Time: 2/28/2022 2:03 PM  Performed by: attending  Exam assisted by:  Catherine De Jesus RN . Type of exam performed: speculum. External genitalia appearance: normal.    Vaginal exam:  bleeding (Significant discharge, inflammation, lesions, odor. Small amount of blood noted in vaginal canal.  No active hemorrhage). Bleeding: mild  Cervical exam:  os closed and no cervical motion tenderness. Specimen(s) collected:  chlamydia and GC.   Patient tolerance: patient tolerated the procedure well with no immediate complications            Results Include:    Recent Results (from the past 24 hour(s))   CBC WITH AUTOMATED DIFF    Collection Time: 02/28/22 10:40 AM   Result Value Ref Range    WBC 8.7 4.3 - 11.1 K/uL    RBC 4.47 4.05 - 5.2 M/uL    HGB 13.4 11.7 - 15.4 g/dL    HCT 40.4 35.8 - 46.3 %    MCV 90.4 79.6 - 97.8 FL    MCH 30.0 26.1 - 32.9 PG MCHC 33.2 31.4 - 35.0 g/dL    RDW 12.9 11.9 - 14.6 %    PLATELET 255 357 - 502 K/uL    MPV 9.9 9.4 - 12.3 FL    ABSOLUTE NRBC 0.00 0.0 - 0.2 K/uL    DF AUTOMATED      NEUTROPHILS 62 43 - 78 %    LYMPHOCYTES 27 13 - 44 %    MONOCYTES 6 4.0 - 12.0 %    EOSINOPHILS 3 0.5 - 7.8 %    BASOPHILS 1 0.0 - 2.0 %    IMMATURE GRANULOCYTES 1 0.0 - 5.0 %    ABS. NEUTROPHILS 5.4 1.7 - 8.2 K/UL    ABS. LYMPHOCYTES 2.3 0.5 - 4.6 K/UL    ABS. MONOCYTES 0.5 0.1 - 1.3 K/UL    ABS. EOSINOPHILS 0.3 0.0 - 0.8 K/UL    ABS. BASOPHILS 0.1 0.0 - 0.2 K/UL    ABS. IMM. GRANS. 0.1 0.0 - 0.5 K/UL   METABOLIC PANEL, COMPREHENSIVE    Collection Time: 02/28/22 10:40 AM   Result Value Ref Range    Sodium 136 136 - 145 mmol/L    Potassium 3.9 3.5 - 5.1 mmol/L    Chloride 108 (H) 98 - 107 mmol/L    CO2 24 21 - 32 mmol/L    Anion gap 4 (L) 7 - 16 mmol/L    Glucose 103 (H) 65 - 100 mg/dL    BUN 11 6 - 23 MG/DL    Creatinine 0.60 0.6 - 1.0 MG/DL    GFR est AA >60 >60 ml/min/1.73m2    GFR est non-AA >60 >60 ml/min/1.73m2    Calcium 8.8 8.3 - 10.4 MG/DL    Bilirubin, total 0.5 0.2 - 1.1 MG/DL    ALT (SGPT) 24 12 - 65 U/L    AST (SGOT) 20 15 - 37 U/L    Alk.  phosphatase 68 50 - 136 U/L    Protein, total 7.5 6.3 - 8.2 g/dL    Albumin 3.5 3.5 - 5.0 g/dL    Globulin 4.0 (H) 2.3 - 3.5 g/dL    A-G Ratio 0.9 (L) 1.2 - 3.5     LIPASE    Collection Time: 02/28/22 10:40 AM   Result Value Ref Range    Lipase 88 73 - 393 U/L   BETA HCG, QT    Collection Time: 02/28/22 10:40 AM   Result Value Ref Range    Beta HCG, QT 28,933 (H) 0.0 - 6.0 MIU/ML   BLOOD TYPE, (ABO+RH)    Collection Time: 02/28/22 10:40 AM   Result Value Ref Range    ABO/Rh(D) O POSITIVE    HCG URINE, QL. - POC    Collection Time: 02/28/22 12:35 PM   Result Value Ref Range    Pregnancy test,urine (POC) Positive (A) NEG     WET PREP    Collection Time: 02/28/22  1:32 PM    Specimen: Vaginal Specimen   Result Value Ref Range    Special Requests: NO SPECIAL REQUESTS      Wet prep 10 TO 20 WBC'S SEEN PER HPF Wet prep NO YEAST,TRICHOMONAS OR CLUE CELLS NOTED                 Ridge Chávez MD; 2/28/2022 @1:45 PM Voice dictation software was used during the making of this note. This software is not perfect and grammatical and other typographical errors may be present.   This note has not been proofread for errors.  ===================================================================

## 2022-02-28 NOTE — ED NOTES
I have reviewed discharge instructions with the patient. The patient verbalized understanding. Patient left ED via Discharge Method: ambulatory to Home with self. Opportunity for questions and clarification provided. Patient given 0 scripts. To continue your aftercare when you leave the hospital, you may receive an automated call from our care team to check in on how you are doing. This is a free service and part of our promise to provide the best care and service to meet your aftercare needs.  If you have questions, or wish to unsubscribe from this service please call [phone]. Thank you for Choosing our [facility] Emergency Department.

## 2022-02-28 NOTE — DISCHARGE INSTRUCTIONS
Schedule follow-up with OB/GYN within 48 hours for recheck. Return to ED if symptoms worsen or progress in any way.   Take prenatal vitamins as directed

## 2022-03-01 LAB
BILIRUB UR QL: NEGATIVE
C TRACH RRNA SPEC QL NAA+PROBE: NEGATIVE
GLUCOSE UR QL STRIP.AUTO: NEGATIVE MG/DL
KETONES UR-MCNC: NEGATIVE MG/DL
LEUKOCYTE ESTERASE UR QL STRIP: NEGATIVE
N GONORRHOEA RRNA SPEC QL NAA+PROBE: NEGATIVE
NITRITE UR QL: NEGATIVE
PH UR: 7 [PH] (ref 5–9)
PROT UR QL: NEGATIVE MG/DL
RBC # UR STRIP: ABNORMAL /UL
SP GR UR: >1.03 (ref 1–1.02)
SPECIMEN SOURCE: NORMAL
UROBILINOGEN UR QL: 0.2 EU/DL (ref 0.2–1)

## 2022-03-10 PROBLEM — Z34.90 PREGNANCY: Status: ACTIVE | Noted: 2022-03-10

## 2022-03-10 PROBLEM — K35.20 ACUTE APPENDICITIS WITH GENERALIZED PERITONITIS, WITHOUT GANGRENE OR ABSCESS: Status: RESOLVED | Noted: 2021-10-05 | Resolved: 2022-03-10

## 2022-03-10 PROBLEM — Z90.49 S/P APPENDECTOMY: Status: RESOLVED | Noted: 2021-10-20 | Resolved: 2022-03-10

## 2022-03-10 PROBLEM — N83.201 CYST OF RIGHT OVARY: Status: RESOLVED | Noted: 2021-10-05 | Resolved: 2022-03-10

## 2022-05-25 ENCOUNTER — TELEPHONE (OUTPATIENT)
Dept: OBGYN CLINIC | Age: 22
End: 2022-05-25

## 2022-05-25 NOTE — TELEPHONE ENCOUNTER
Patient had NIPT on 4/20 with Dr. Estefany Black. This was re-drawn on 5/18 by Sy Stephenson. Is there a reason why patient had drawn again? Received call from lab, stating patient had already had this done.

## 2022-06-08 ENCOUNTER — ROUTINE PRENATAL (OUTPATIENT)
Dept: OBGYN CLINIC | Age: 22
End: 2022-06-08

## 2022-06-08 VITALS — SYSTOLIC BLOOD PRESSURE: 104 MMHG | BODY MASS INDEX: 30.98 KG/M2 | DIASTOLIC BLOOD PRESSURE: 60 MMHG | WEIGHT: 169.4 LBS

## 2022-06-08 DIAGNOSIS — Z13.89 SCREENING FOR GENITOURINARY CONDITION: Primary | ICD-10-CM

## 2022-06-08 DIAGNOSIS — Z3A.20 20 WEEKS GESTATION OF PREGNANCY: ICD-10-CM

## 2022-06-08 DIAGNOSIS — Z34.02 ENCOUNTER FOR SUPERVISION OF NORMAL FIRST PREGNANCY IN SECOND TRIMESTER: ICD-10-CM

## 2022-06-08 DIAGNOSIS — Z36.3 ENCOUNTER FOR ANTENATAL SCREENING FOR MALFORMATION USING ULTRASOUND: ICD-10-CM

## 2022-06-08 LAB
GLUCOSE URINE, POC: NEGATIVE
PROTEIN,URINE, POC: NEGATIVE

## 2022-06-08 PROCEDURE — 76805 OB US >/= 14 WKS SNGL FETUS: CPT | Performed by: OBSTETRICS & GYNECOLOGY

## 2022-06-08 PROCEDURE — 99902 PR PRENATAL VISIT: CPT | Performed by: OBSTETRICS & GYNECOLOGY

## 2022-06-08 PROCEDURE — 81002 URINALYSIS NONAUTO W/O SCOPE: CPT | Performed by: OBSTETRICS & GYNECOLOGY

## 2022-06-21 ENCOUNTER — HOSPITAL ENCOUNTER (EMERGENCY)
Age: 22
Discharge: HOME | End: 2022-06-21
Attending: EMERGENCY MEDICINE

## 2022-06-21 VITALS
RESPIRATION RATE: 16 BRPM | HEIGHT: 62 IN | DIASTOLIC BLOOD PRESSURE: 67 MMHG | OXYGEN SATURATION: 99 % | WEIGHT: 169 LBS | SYSTOLIC BLOOD PRESSURE: 120 MMHG | BODY MASS INDEX: 31.1 KG/M2 | HEART RATE: 84 BPM | TEMPERATURE: 98.2 F

## 2022-06-21 DIAGNOSIS — O23.12 ACUTE CYSTITIS DURING PREGNANCY IN SECOND TRIMESTER: Primary | ICD-10-CM

## 2022-06-21 LAB
APPEARANCE UR: ABNORMAL
BACTERIA URNS QL MICRO: ABNORMAL /HPF
BILIRUB UR QL: NEGATIVE
BILIRUB UR QL: NEGATIVE
COLOR UR: ABNORMAL
GLUCOSE UR QL STRIP.AUTO: NEGATIVE MG/DL
GLUCOSE UR STRIP.AUTO-MCNC: NEGATIVE MG/DL
HGB UR QL STRIP: ABNORMAL
KETONES UR QL STRIP.AUTO: 40 MG/DL
KETONES UR-MCNC: 80 MG/DL
LEUKOCYTE ESTERASE UR QL STRIP.AUTO: ABNORMAL
LEUKOCYTE ESTERASE UR QL STRIP: ABNORMAL
NITRITE UR QL STRIP.AUTO: POSITIVE
NITRITE UR QL: POSITIVE
OTHER OBSERVATIONS: ABNORMAL
PH UR STRIP: 6.5 [PH] (ref 5–9)
PH UR: 6.5 [PH] (ref 5–9)
PROT UR QL: 100 MG/DL
PROT UR STRIP-MCNC: 100 MG/DL
RBC # UR STRIP: ABNORMAL /UL
RBC #/AREA URNS HPF: ABNORMAL /HPF
SERVICE CMNT-IMP: ABNORMAL
SP GR UR REFRACTOMETRY: 1.02 (ref 1–1.02)
SP GR UR: >1.03 (ref 1–1.02)
UROBILINOGEN UR QL STRIP.AUTO: 0.2 EU/DL (ref 0.2–1)
UROBILINOGEN UR QL: 0.2 EU/DL (ref 0.2–1)
WBC URNS QL MICRO: >100 /HPF

## 2022-06-21 PROCEDURE — 99283 EMERGENCY DEPT VISIT LOW MDM: CPT

## 2022-06-21 PROCEDURE — 81003 URINALYSIS AUTO W/O SCOPE: CPT

## 2022-06-21 PROCEDURE — 81001 URINALYSIS AUTO W/SCOPE: CPT

## 2022-06-21 RX ORDER — AMOXICILLIN AND CLAVULANATE POTASSIUM 875; 125 MG/1; MG/1
1 TABLET, FILM COATED ORAL 2 TIMES DAILY
Qty: 14 TABLET | Refills: 0 | Status: SHIPPED | OUTPATIENT
Start: 2022-06-21 | End: 2022-06-28

## 2022-06-21 ASSESSMENT — LIFESTYLE VARIABLES: HOW OFTEN DO YOU HAVE A DRINK CONTAINING ALCOHOL: NEVER

## 2022-06-21 ASSESSMENT — ENCOUNTER SYMPTOMS
NAUSEA: 0
ABDOMINAL PAIN: 0
VOMITING: 0
SHORTNESS OF BREATH: 0
BACK PAIN: 0

## 2022-06-21 ASSESSMENT — PAIN SCALES - GENERAL: PAINLEVEL_OUTOF10: 9

## 2022-06-21 NOTE — ED TRIAGE NOTES
Pt arrives ambulatory to triage. Reports burning with urination that started today with some blood when she wipes after urinating. Denies back pain. Denies vaginal discharge. Pt is 22 weeks pregnant. Denies abdominal pain. Denies vaginal bleeding. NAD. Masked.

## 2022-06-21 NOTE — ED NOTES
I have reviewed discharge instructions with the patient. The patient verbalized understanding. Patient left ED via Discharge Method: ambulatory to Home with self. Opportunity for questions and clarification provided. Patient given 1 scripts. To continue your aftercare when you leave the hospital, you may receive an automated call from our care team to check in on how you are doing. This is a free service and part of our promise to provide the best care and service to meet your aftercare needs.  If you have questions, or wish to unsubscribe from this service please call [phone]. Thank you for Choosing our [facility] Emergency Department.         Celia Boone RN  06/21/22 9360

## 2022-06-22 NOTE — ED PROVIDER NOTES
[facility] Emergency Department Provider Note                   PCP:                None Provider               Age: 19 y.o. Sex: female       ICD-10-CM    1. Acute cystitis during pregnancy in second trimester  O23.12        DISPOSITION Decision To Discharge 06/21/2022 07:18:42 PM       Discharge Medication List as of 6/21/2022  7:23 PM      START taking these medications    Details   amoxicillin-clavulanate (AUGMENTIN) 875-125 MG per tablet Take 1 tablet by mouth 2 times daily for 7 days, Disp-14 tablet, R-0Print             Orders Placed This Encounter   Procedures    Culture, Urine    Urinalysis w rflx microscopic    POCT Urine Dipstick    POCT Urinalysis no Micro        RADHA THAD YU 9:21 PM      MDM  Number of Diagnoses or Management Options  Acute cystitis during pregnancy in second trimester  Diagnosis management comments: In summary this a well-appearing 77-year-old female presenting today for evaluation of dysuria. She is 22 weeks pregnant. She has had no fever, nausea or vomiting, no vaginal bleeding, no abdominal cramping. Urine does show signs of urinary tract infection, urine culture is pending, will start patient on Augmentin per up-to-date guidelines for first-line treatment of UTI in pregnancy. Patient instructed to follow-up with OB/GYN, or return to the ER if she develops any new or worsening symptoms. Amount and/or Complexity of Data Reviewed  Clinical lab tests: ordered and reviewed    Patient Progress  Patient progress: corbin Escamilla is a 19 y.o. female who presents to the Emergency Department with chief complaint of    Chief Complaint   Patient presents with    Dysuria      77-year-old well-appearing female presents today for evaluation of dysuria. Onset is described as this morning when she woke up. She denies any associated abdominal pain or cramping. She denies any vaginal bleeding but states that she noticed some blood in her urine.   She denies passing any large clots. She denies any associated fever, chills or body aches. No nausea or vomiting. She states that she has normal fetal movements, her next OB appointment is in 2 weeks. No Recent hospitalizations or surgeries. Review of Systems   Constitutional: Negative for activity change, chills and fever. Respiratory: Negative for shortness of breath. Cardiovascular: Negative for chest pain. Gastrointestinal: Negative for abdominal pain, nausea and vomiting. Genitourinary: Positive for dysuria. Negative for pelvic pain, vaginal bleeding and vaginal discharge. Musculoskeletal: Negative for back pain. Allergic/Immunologic: Negative for immunocompromised state. Neurological: Negative for numbness and headaches. Hematological: Does not bruise/bleed easily. Past Medical History:   Diagnosis Date    Appendicitis 10/05/2021    Ovarian cyst 10/05/2021    20 cm - right ovarian cyst        Past Surgical History:   Procedure Laterality Date    APPENDECTOMY  10/05/2021    OVARIAN CYST REMOVAL  10/05/2021    ovarian cyst excision- right        Family History   Problem Relation Age of Onset    Other Mother         prediabetes    Hypertension Father     Other Paternal Grandfather         prediabetes           Social Connections:     Frequency of Communication with Friends and Family: Not on file    Frequency of Social Gatherings with Friends and Family: Not on file    Attends Yazidism Services: Not on file    Active Member of Clubs or Organizations: Not on file    Attends Club or Organization Meetings: Not on file    Marital Status: Not on file        No Known Allergies     Vitals signs and nursing note reviewed. No data found. Physical Exam  Vitals and nursing note reviewed. Constitutional:       General: She is not in acute distress. Appearance: Normal appearance. HENT:      Head: Normocephalic and atraumatic.    Eyes:      Conjunctiva/sclera: Conjunctivae normal.   Cardiovascular:      Rate and Rhythm: Normal rate and regular rhythm. Heart sounds: No murmur heard. No friction rub. No gallop. Pulmonary:      Effort: Pulmonary effort is normal.   Abdominal:      Palpations: Abdomen is soft. Tenderness: There is no abdominal tenderness. There is no right CVA tenderness, left CVA tenderness, guarding or rebound. Skin:     General: Skin is warm and dry. Neurological:      General: No focal deficit present. Mental Status: She is alert and oriented to person, place, and time. Sensory: No sensory deficit. Psychiatric:         Mood and Affect: Mood normal.         Thought Content: Thought content normal.         Judgment: Judgment normal.          Procedures      Labs Reviewed   URINALYSIS - Abnormal; Notable for the following components:       Result Value    Protein,  (*)     Ketones, Urine 40 (*)     Blood, Urine LARGE (*)     Nitrite, Urine Positive (*)     Leukocyte Esterase, Urine MODERATE (*)     BACTERIA, URINE 4+ (*)     All other components within normal limits   POCT URINALYSIS DIPSTICK - Abnormal; Notable for the following components:    Specific Gravity, Urine, POC >1.030 (*)     Protein, Urine,  (*)     KETONES, Urine, POC 80 (*)     Blood, UA POC LARGE (*)     Nitrate, Urine, POC Positive (*)     Leukocyte Est, UA POC LARGE (*)     All other components within normal limits   CULTURE, URINE        No orders to display                    ED Course as of 06/21/22 2121 Tue Jun 21, 2022   1747 Waiting on urine sample [KE]   1816 Waiting on urine micro [KE]   1816 Nitrate, Urine, POC(!): Positive [KE]   1816 WBC, UA(!): LARGE [KE]      ED Course User Index  [KE] THAD Payan        Voice dictation software was used during the making of this note. This software is not perfect and grammatical and other typographical errors may be present. This note has not been completely proofread for errors. Odalis [place]  06/21/22 2124

## 2022-07-05 ENCOUNTER — HOSPITAL ENCOUNTER (EMERGENCY)
Age: 22
Discharge: HOME | End: 2022-07-05
Attending: EMERGENCY MEDICINE

## 2022-07-05 VITALS
RESPIRATION RATE: 16 BRPM | BODY MASS INDEX: 31.65 KG/M2 | HEART RATE: 70 BPM | HEIGHT: 62 IN | TEMPERATURE: 97.9 F | WEIGHT: 172 LBS | DIASTOLIC BLOOD PRESSURE: 68 MMHG | OXYGEN SATURATION: 98 % | SYSTOLIC BLOOD PRESSURE: 106 MMHG

## 2022-07-05 DIAGNOSIS — B96.89 BACTERIAL VAGINOSIS IN PREGNANCY: ICD-10-CM

## 2022-07-05 DIAGNOSIS — O23.599 BACTERIAL VAGINOSIS IN PREGNANCY: ICD-10-CM

## 2022-07-05 DIAGNOSIS — N39.0 ACUTE UTI: Primary | ICD-10-CM

## 2022-07-05 DIAGNOSIS — O23.42 URINARY TRACT INFECTION IN MOTHER DURING SECOND TRIMESTER OF PREGNANCY: ICD-10-CM

## 2022-07-05 LAB
ALBUMIN SERPL-MCNC: 2.8 G/DL (ref 3.5–5)
ALBUMIN/GLOB SERPL: 0.7 {RATIO} (ref 1.2–3.5)
ALP SERPL-CCNC: 88 U/L (ref 50–136)
ALT SERPL-CCNC: 17 U/L (ref 12–65)
ANION GAP SERPL CALC-SCNC: 7 MMOL/L (ref 7–16)
APPEARANCE UR: ABNORMAL
AST SERPL-CCNC: 13 U/L (ref 15–37)
BACTERIA URNS QL MICRO: ABNORMAL /HPF
BASOPHILS # BLD: 0.1 K/UL (ref 0–0.2)
BASOPHILS NFR BLD: 1 % (ref 0–2)
BILIRUB SERPL-MCNC: 0.3 MG/DL (ref 0.2–1.1)
BILIRUB UR QL: NEGATIVE
BILIRUB UR QL: NEGATIVE
BUN SERPL-MCNC: 5 MG/DL (ref 6–23)
CALCIUM SERPL-MCNC: 9 MG/DL (ref 8.3–10.4)
CHLORIDE SERPL-SCNC: 107 MMOL/L (ref 98–107)
CO2 SERPL-SCNC: 24 MMOL/L (ref 21–32)
COLOR UR: ABNORMAL
CREAT SERPL-MCNC: 0.55 MG/DL (ref 0.6–1)
DIFFERENTIAL METHOD BLD: ABNORMAL
EOSINOPHIL # BLD: 0.2 K/UL (ref 0–0.8)
EOSINOPHIL NFR BLD: 2 % (ref 0.5–7.8)
EPI CELLS #/AREA URNS HPF: ABNORMAL /HPF
ERYTHROCYTE [DISTWIDTH] IN BLOOD BY AUTOMATED COUNT: 13.2 % (ref 11.9–14.6)
GLOBULIN SER CALC-MCNC: 3.9 G/DL (ref 2.3–3.5)
GLUCOSE SERPL-MCNC: 100 MG/DL (ref 65–100)
GLUCOSE UR QL STRIP.AUTO: NEGATIVE MG/DL
GLUCOSE UR STRIP.AUTO-MCNC: NEGATIVE MG/DL
HCG UR QL: POSITIVE
HCT VFR BLD AUTO: 34 % (ref 35.8–46.3)
HGB BLD-MCNC: 11.3 G/DL (ref 11.7–15.4)
HGB UR QL STRIP: ABNORMAL
IMM GRANULOCYTES # BLD AUTO: 0.3 K/UL (ref 0–0.5)
IMM GRANULOCYTES NFR BLD AUTO: 2 % (ref 0–5)
KETONES UR QL STRIP.AUTO: ABNORMAL MG/DL
KETONES UR-MCNC: NEGATIVE MG/DL
LEUKOCYTE ESTERASE UR QL STRIP.AUTO: ABNORMAL
LEUKOCYTE ESTERASE UR QL STRIP: ABNORMAL
LIPASE SERPL-CCNC: 112 U/L (ref 73–393)
LYMPHOCYTES # BLD: 2.2 K/UL (ref 0.5–4.6)
LYMPHOCYTES NFR BLD: 15 % (ref 13–44)
MCH RBC QN AUTO: 29.7 PG (ref 26.1–32.9)
MCHC RBC AUTO-ENTMCNC: 33.2 G/DL (ref 31.4–35)
MCV RBC AUTO: 89.2 FL (ref 79.6–97.8)
MONOCYTES # BLD: 0.9 K/UL (ref 0.1–1.3)
MONOCYTES NFR BLD: 6 % (ref 4–12)
MUCOUS THREADS URNS QL MICRO: ABNORMAL /LPF
NEUTS SEG # BLD: 10.8 K/UL (ref 1.7–8.2)
NEUTS SEG NFR BLD: 74 % (ref 43–78)
NITRITE UR QL STRIP.AUTO: POSITIVE
NITRITE UR QL: POSITIVE
NRBC # BLD: 0 K/UL (ref 0–0.2)
OTHER OBSERVATIONS: ABNORMAL
PH UR STRIP: 6.5 [PH] (ref 5–9)
PH UR: 7 [PH] (ref 5–9)
PLATELET # BLD AUTO: 267 K/UL (ref 150–450)
PMV BLD AUTO: 10.2 FL (ref 9.4–12.3)
POTASSIUM SERPL-SCNC: 3.9 MMOL/L (ref 3.5–5.1)
PROT SERPL-MCNC: 6.7 G/DL (ref 6.3–8.2)
PROT UR QL: 100 MG/DL
PROT UR STRIP-MCNC: 100 MG/DL
RBC # BLD AUTO: 3.81 M/UL (ref 4.05–5.2)
RBC # UR STRIP: ABNORMAL /UL
RBC #/AREA URNS HPF: ABNORMAL /HPF
SERVICE CMNT-IMP: ABNORMAL
SERVICE CMNT-IMP: NORMAL
SODIUM SERPL-SCNC: 138 MMOL/L (ref 136–145)
SP GR UR REFRACTOMETRY: 1.02 (ref 1–1.02)
SP GR UR: >1.03 (ref 1–1.02)
UROBILINOGEN UR QL STRIP.AUTO: 1 EU/DL (ref 0.2–1)
UROBILINOGEN UR QL: 0.2 EU/DL (ref 0.2–1)
WBC # BLD AUTO: 14.5 K/UL (ref 4.3–11.1)
WBC URNS QL MICRO: ABNORMAL /HPF
WET PREP GENITAL: NORMAL

## 2022-07-05 PROCEDURE — 87186 SC STD MICRODIL/AGAR DIL: CPT

## 2022-07-05 PROCEDURE — 2580000003 HC RX 258: Performed by: EMERGENCY MEDICINE

## 2022-07-05 PROCEDURE — 87210 SMEAR WET MOUNT SALINE/INK: CPT

## 2022-07-05 PROCEDURE — 87088 URINE BACTERIA CULTURE: CPT

## 2022-07-05 PROCEDURE — 81003 URINALYSIS AUTO W/O SCOPE: CPT

## 2022-07-05 PROCEDURE — 99284 EMERGENCY DEPT VISIT MOD MDM: CPT

## 2022-07-05 PROCEDURE — 6360000002 HC RX W HCPCS: Performed by: EMERGENCY MEDICINE

## 2022-07-05 PROCEDURE — 87086 URINE CULTURE/COLONY COUNT: CPT

## 2022-07-05 PROCEDURE — 85025 COMPLETE CBC W/AUTO DIFF WBC: CPT

## 2022-07-05 PROCEDURE — 80053 COMPREHEN METABOLIC PANEL: CPT

## 2022-07-05 PROCEDURE — 6370000000 HC RX 637 (ALT 250 FOR IP): Performed by: EMERGENCY MEDICINE

## 2022-07-05 PROCEDURE — 81025 URINE PREGNANCY TEST: CPT

## 2022-07-05 PROCEDURE — 83690 ASSAY OF LIPASE: CPT

## 2022-07-05 PROCEDURE — 81001 URINALYSIS AUTO W/SCOPE: CPT

## 2022-07-05 PROCEDURE — 96365 THER/PROPH/DIAG IV INF INIT: CPT

## 2022-07-05 PROCEDURE — 2580000003 HC RX 258: Performed by: PHYSICIAN ASSISTANT

## 2022-07-05 PROCEDURE — 87491 CHLMYD TRACH DNA AMP PROBE: CPT

## 2022-07-05 RX ORDER — AZITHROMYCIN 250 MG/1
1000 TABLET, FILM COATED ORAL
Status: COMPLETED | OUTPATIENT
Start: 2022-07-05 | End: 2022-07-05

## 2022-07-05 RX ORDER — 0.9 % SODIUM CHLORIDE 0.9 %
1000 INTRAVENOUS SOLUTION INTRAVENOUS ONCE
Status: COMPLETED | OUTPATIENT
Start: 2022-07-05 | End: 2022-07-05

## 2022-07-05 RX ORDER — METRONIDAZOLE 500 MG/1
500 TABLET ORAL 2 TIMES DAILY
Qty: 28 TABLET | Refills: 0 | Status: SHIPPED | OUTPATIENT
Start: 2022-07-05 | End: 2022-07-19

## 2022-07-05 RX ORDER — SODIUM CHLORIDE 0.9 % (FLUSH) 0.9 %
3 SYRINGE (ML) INJECTION EVERY 8 HOURS
Status: DISCONTINUED | OUTPATIENT
Start: 2022-07-05 | End: 2022-07-05 | Stop reason: HOSPADM

## 2022-07-05 RX ORDER — ONDANSETRON 4 MG/1
4 TABLET, FILM COATED ORAL EVERY 8 HOURS PRN
Qty: 10 TABLET | Refills: 0 | Status: ON HOLD | OUTPATIENT
Start: 2022-07-05 | End: 2022-09-25 | Stop reason: HOSPADM

## 2022-07-05 RX ORDER — CEPHALEXIN 500 MG/1
500 CAPSULE ORAL 4 TIMES DAILY
Qty: 28 CAPSULE | Refills: 0 | Status: SHIPPED | OUTPATIENT
Start: 2022-07-05 | End: 2022-07-12

## 2022-07-05 RX ADMIN — SODIUM CHLORIDE 1000 ML: 9 INJECTION, SOLUTION INTRAVENOUS at 17:00

## 2022-07-05 RX ADMIN — CEFTRIAXONE 1000 MG: 1 INJECTION, POWDER, FOR SOLUTION INTRAMUSCULAR; INTRAVENOUS at 17:00

## 2022-07-05 RX ADMIN — SODIUM CHLORIDE, PRESERVATIVE FREE 3 ML: 5 INJECTION INTRAVENOUS at 17:01

## 2022-07-05 RX ADMIN — AZITHROMYCIN MONOHYDRATE 1000 MG: 250 TABLET ORAL at 18:44

## 2022-07-05 ASSESSMENT — ENCOUNTER SYMPTOMS
BLOOD IN STOOL: 0
ABDOMINAL DISTENTION: 0
COUGH: 0
CONSTIPATION: 0
ABDOMINAL PAIN: 1
VOMITING: 1
BACK PAIN: 0
SHORTNESS OF BREATH: 0
DIARRHEA: 0
NAUSEA: 1
RHINORRHEA: 0

## 2022-07-05 ASSESSMENT — LIFESTYLE VARIABLES: HOW OFTEN DO YOU HAVE A DRINK CONTAINING ALCOHOL: NEVER

## 2022-07-05 NOTE — ED TRIAGE NOTES
Pt arrives ambulatory to triage. States lower abd pain that started yesterday. Pt is 24 weeks pregnant. Denies vaginal bleeding. Denies dysuria. Denies nvd. Unsure who her OB Gyn is. First pregnancy. NAD. Masked.

## 2022-07-05 NOTE — ED PROVIDER NOTES
[facility] Emergency Department Provider Note                   PCP:                None Provider               Age: 19 y.o. Sex: female       ICD-10-CM    1. Acute UTI  N39.0    2. Urinary tract infection in mother during second trimester of pregnancy  O23.42    3. Bacterial vaginosis in pregnancy  O23.599     B96.89        DISPOSITION Decision To Discharge 07/05/2022 06:58:17 PM        MDM  Number of Diagnoses or Management Options  Acute UTI: new, needed workup  Bacterial vaginosis in pregnancy: new, needed workup  Urinary tract infection in mother during second trimester of pregnancy: new, needed workup  Diagnosis management comments: O positive. Vital signs stable. Patient noted to have leukocytosis. UA with evidence of UTI. Urine culture added on. Rocephin 1 g IV given. Bedside ultrasound with evidence of good fetal movement. Fetal heart tones 150s. Pelvic exam with significant discharge. Patient given Zithromax 1 g p.o. Given concern for possible PID will discharge home with Flagyl 500 mg twice daily for 14 days in addition to Keflex for UTI. Repeat abdominal exam soft with no rebound or guarding. No peritoneal signs. Attempts made to contact OB/GYN on-call. Patient instructed to follow-up with next 24 to 48 hours.        Amount and/or Complexity of Data Reviewed  Clinical lab tests: ordered and reviewed  Tests in the radiology section of CPT®: ordered and reviewed  Tests in the medicine section of CPT®: ordered and reviewed  Review and summarize past medical records: yes  Discuss the patient with other providers: yes  Independent visualization of images, tracings, or specimens: yes    Risk of Complications, Morbidity, and/or Mortality  Presenting problems: moderate  Diagnostic procedures: moderate  Management options: moderate  General comments: Results Include:    Recent Results (from the past 24 hour(s))  -CBC with Diff:   Collection Time: 07/05/22 12:01 PM       Result Value             Ref Range           WBC                         14.5 (H)          4.3 - 11.1 K*       RBC                         3.81 (L)          4.05 - 5.2 M*       Hemoglobin                  11.3 (L)          11.7 - 15.4 *       Hematocrit                  34.0 (L)          35.8 - 46.3 %       MCV                         89.2              79.6 - 97.8 *       MCH                         29.7              26.1 - 32.9 *       MCHC                        33.2              31.4 - 35.0 *       RDW                         13.2              11.9 - 14.6 %       Platelets                   267               150 - 450 K/*       MPV                         10.2              9.4 - 12.3 FL       nRBC                        0.00              0.0 - 0.2 K/*       Differential Type           AUTOMATED                             Seg Neutrophils             74                43 - 78 %           Lymphocytes                 15                13 - 44 %           Monocytes                   6                 4.0 - 12.0 %        Eosinophils %               2                 0.5 - 7.8 %         Basophils                   1                 0.0 - 2.0 %         Immature Granulocytes       2                 0.0 - 5.0 %         Segs Absolute               10.8 (H)          1.7 - 8.2 K/*       Absolute Lymph #            2.2               0.5 - 4.6 K/*       Absolute Mono #             0.9               0.1 - 1.3 K/*       Absolute Eos #              0.2               0.0 - 0.8 K/*       Basophils Absolute          0.1               0.0 - 0.2 K/*       Absolute Immature Gran*     0.3               0.0 - 0.5 K/*  -CMP:   Collection Time: 07/05/22 12:01 PM       Result                      Value             Ref Range           Sodium                      138               136 - 145 mm*       Potassium                   3.9               3.5 - 5.1 mm*       Chloride                    107               98 - 107 mmo*       CO2 24                21 - 32 mmol*       Anion Gap                   7                 7 - 16 mmol/L       Glucose                     100               65 - 100 mg/*       BUN                         5 (L)             6 - 23 MG/DL        CREATININE                  0.55 (L)          0.6 - 1.0 MG*       GFR         >60               >60 ml/min/1*       GFR Non- Americ*     >60               >60 ml/min/1*       Calcium                     9.0               8.3 - 10.4 M*       Total Bilirubin             0.3               0.2 - 1.1 MG*       ALT                         17                12 - 65 U/L         AST                         13 (L)            15 - 37 U/L         Alk Phosphatase             88                50 - 136 U/L        Total Protein               6.7               6.3 - 8.2 g/*       Albumin                     2.8 (L)           3.5 - 5.0 g/*       Globulin                    3.9 (H)           2.3 - 3.5 g/*       Albumin/Globulin Ratio      0.7 (L)           1.2 - 3.5      -Lipase:   Collection Time: 07/05/22 12:01 PM       Result                      Value             Ref Range           Lipase                      112               73 - 393 U/L   -POCT Urinalysis no Micro:   Collection Time: 07/05/22  3:34 PM       Result                      Value             Ref Range           Specific Gravity, Urin*     >1.030 (H)        1.001 - 1.023       pH, Urine, POC              7.0               5.0 - 9.0           Protein, Urine, POC         100 (A)           NEG mg/dL           Glucose, UA POC             Negative          NEG mg/dL           KETONES, Urine, POC         Negative          NEG mg/dL           Bilirubin, Urine, POC       Negative          NEG                 Blood, UA POC                                 NEG             Trace Intact (A)       URINE UROBILINOGEN POC      0.2               0.2 - 1.0 EU*       Nitrate, Urine, POC         Positive (A)      NEG Leukocyte Est, UA POC       SMALL (A)         NEG                 Performed by:               Ace Wen                     -Urinalysis w rflx microscopic:   Collection Time: 07/05/22  3:40 PM       Result                      Value             Ref Range           Color, UA                   YELLOW/STRAW                          Appearance                  TURBID                                Specific Gravity, UA        1.022             1.001 - 1.02*       pH, Urine                   6.5               5.0 - 9.0           Protein, UA                 100 (A)           NEG mg/dL           Glucose, UA                 Negative          mg/dL               Ketones, Urine              TRACE (A)         NEG mg/dL           Bilirubin Urine             Negative          NEG                 Blood, Urine                TRACE (A)         NEG                 Urobilinogen, Urine         1.0               0.2 - 1.0 EU*       Nitrite, Urine              Positive (A)      NEG                 Leukocyte Esterase, Ur*     LARGE (A)         NEG                 WBC, UA                     10-20             0 /hpf              RBC, UA                     0-3               0 /hpf              Epithelial Cells UA         0-3               0 /hpf              BACTERIA, URINE             4+ (H)            0 /hpf              Mucus, UA                   1+ (H)            0 /lpf              OTHER OBSERVATIONS                                            RESULTS VERIFIED MANUALLY  -POC Pregnancy Urine Qual:   Collection Time: 07/05/22  3:41 PM       Result                      Value             Ref Range           Preg Test, Ur               Positive (A)      NEG            -Wet prep, genital:   Collection Time: 07/05/22  6:31 PM  Specimen: Vaginal       Result                      Value             Ref Range           Special Requests                                              NO SPECIAL REQUESTS       Wet Prep NO YEAST SEEN                         Wet Prep                                                      NO TRICHOMONAS SEEN       Wet Prep                                                      MODERATE CLUE CELLS PRESENT        Wet Prep                                                      0 TO 3 WBC SEEN PER HPF      Patient Progress  Patient progress: stable       Orders Placed This Encounter   Procedures    Culture, Urine    Wet prep, genital    C.trachomatis N.gonorrhoeae DNA    CBC with Diff    CMP    Lipase    Urinalysis w rflx microscopic    Diet NPO    POCT Urine Dipstick    PELVIC EXAM SET UP    POCT Urinalysis no Micro    POC Pregnancy Urine Qual    Saline lock Nerissa To is a 19 y.o. female who presents to the Emergency Department with chief complaint of suprapubic pain. Chief Complaint   Patient presents with    Abdominal Pain      67-year-old  who is currently 24 weeks pregnant presents with complaint of worsening suprapubic abdominal discomfort and intermittent dysuria. This worsened over the past several days. Patient was seen last month and diagnosed with UTI. Patient states she completed antibiotic. Denies flank pain, chest pain, shortness breath, cough, diarrhea, constipation. Denies pelvic pain, vaginal bleeding, vaginal discharge. States that she is followed by [facility]. Patient underwent anatomy ultrasound on . Denies leakage of fluids. Reports good fetal movement. The history is provided by the patient. No  was used. Dysuria   This is a recurrent problem. The current episode started more than 2 days ago. The problem occurs every urination. The problem has not changed since onset. The quality of the pain is described as burning. The pain is at a severity of 2/10. The pain is mild. There has been no fever. Associated symptoms include nausea and vomiting.  Pertinent negatives include no chills, no sweats, no discharge, no hematuria, no hesitancy, no urgency and no flank pain. She has tried nothing for the symptoms. Review of Systems   Constitutional: Negative for chills, diaphoresis, fatigue and fever. HENT: Negative for congestion and rhinorrhea. Respiratory: Negative for cough and shortness of breath. Cardiovascular: Negative for chest pain. Gastrointestinal: Positive for abdominal pain, nausea and vomiting. Negative for abdominal distention, blood in stool, constipation and diarrhea. Genitourinary: Positive for dysuria. Negative for flank pain, genital sores, hematuria, hesitancy, pelvic pain, urgency, vaginal bleeding and vaginal discharge. Musculoskeletal: Negative for arthralgias, back pain and myalgias. Skin: Negative for rash and wound. Neurological: Negative for dizziness, weakness, light-headedness and headaches. Hematological: Does not bruise/bleed easily. Past Medical History:   Diagnosis Date    Appendicitis 10/05/2021    Ovarian cyst 10/05/2021    20 cm - right ovarian cyst        Past Surgical History:   Procedure Laterality Date    APPENDECTOMY  10/05/2021    OVARIAN CYST REMOVAL  10/05/2021    ovarian cyst excision- right        Family History   Problem Relation Age of Onset    Other Mother         prediabetes    Hypertension Father     Other Paternal Grandfather         prediabetes           Social Connections:     Frequency of Communication with Friends and Family: Not on file    Frequency of Social Gatherings with Friends and Family: Not on file    Attends Latter day Services: Not on file    Active Member of Clubs or Organizations: Not on file    Attends Club or Organization Meetings: Not on file    Marital Status: Not on file        No Known Allergies     Vitals signs and nursing note reviewed. Patient Vitals for the past 4 hrs:   BP SpO2   07/05/22 1616 -- 98 %   07/05/22 1615 106/68 --          Physical Exam  Vitals and nursing note reviewed.  Exam conducted with a chaperone present. Constitutional:       Appearance: She is well-developed. HENT:      Head: Normocephalic. Mouth/Throat:      Mouth: Mucous membranes are moist.   Eyes:      Extraocular Movements: Extraocular movements intact. Pupils: Pupils are equal, round, and reactive to light. Cardiovascular:      Rate and Rhythm: Normal rate. Heart sounds: Normal heart sounds. Pulmonary:      Effort: Pulmonary effort is normal.      Breath sounds: Normal breath sounds. Comments: CTAB. Abdominal:      General: Abdomen is flat. Bowel sounds are normal.      Palpations: Abdomen is soft. Tenderness: There is abdominal tenderness in the suprapubic area. There is no right CVA tenderness, left CVA tenderness, guarding or rebound. Negative signs include Sheets's sign. Comments: Gravid abdomen. No rebound or guarding. No peritoneal signs. Mild suprapubic discomfort. No CVA tenderness. No peritoneal signs. Genitourinary:     Pubic Area: No rash. Comments: RN present for exam.  Significant white vaginal discharge present. Unable to visualize cervix. Under sterile precautions with sterile glove performed manual.  Moderate cervical tenderness present with CMT. No dilation cervix. Skin:     General: Skin is warm. Coloration: Skin is not pale. Findings: No rash. Neurological:      General: No focal deficit present. Mental Status: She is alert and oriented to person, place, and time. Motor: No weakness.           Procedures      Labs Reviewed   CBC WITH AUTO DIFFERENTIAL - Abnormal; Notable for the following components:       Result Value    WBC 14.5 (*)     RBC 3.81 (*)     Hemoglobin 11.3 (*)     Hematocrit 34.0 (*)     Segs Absolute 10.8 (*)     All other components within normal limits   COMPREHENSIVE METABOLIC PANEL - Abnormal; Notable for the following components:    BUN 5 (*)     CREATININE 0.55 (*)     AST 13 (*)     Albumin 2.8 (*)     Globulin 3.9 (*)     Albumin/Globulin Ratio 0.7 (*)     All other components within normal limits   URINALYSIS - Abnormal; Notable for the following components:    Protein,  (*)     Ketones, Urine TRACE (*)     Blood, Urine TRACE (*)     Nitrite, Urine Positive (*)     Leukocyte Esterase, Urine LARGE (*)     BACTERIA, URINE 4+ (*)     Mucus, UA 1+ (*)     All other components within normal limits   POCT URINALYSIS DIPSTICK - Abnormal; Notable for the following components:    Specific Gravity, Urine, POC >1.030 (*)     Protein, Urine,  (*)     Blood, UA POC Trace Intact (*)     Nitrate, Urine, POC Positive (*)     Leukocyte Est, UA POC SMALL (*)     All other components within normal limits   POC PREGNANCY UR-QUAL - Abnormal; Notable for the following components:    Preg Test, Ur Positive (*)     All other components within normal limits   WET PREP, GENITAL   C.TRACHOMATIS N.GONORRHOEAE DNA   CULTURE, URINE   LIPASE        No orders to display                    ED Course as of 07/05/22 1938 Tue Jul 05, 2022   1646 Pregnancy, Urine(!): Positive [DF]   1646 WBC(!): 14.5 [DF]   1646 Nitrite, Urine(!): Positive [DF]   1646 Leukocyte Esterase, Urine(!): LARGE [DF]   1807 WBC, UA: 10-20 [DF]   1807 Bacteria, UA(!): 4+ [DF]   1923 WET PREP: MODERATE  CLUE CELLS PRESENT   [DF]   1923 WET PREP: NO TRICHOMONAS SEEN [DF]   1923 WET PREP: NO YEAST SEEN [DF]      ED Course User Index  [DF] Jose Manuel Church MD        Voice dictation software was used during the making of this note. This software is not perfect and grammatical and other typographical errors may be present. This note has not been completely proofread for errors.      Jose Manuel Church MD  07/05/22 8824

## 2022-07-06 NOTE — ED NOTES
Discharge instructions including prescriptions reviewed with pt. Pt verbalized understanding. VSS, PIV removed. Pt ambulatory to exit in stable condition.      Matteo Basurto RN  07/05/22 2003

## 2022-07-07 ENCOUNTER — ROUTINE PRENATAL (OUTPATIENT)
Dept: OBGYN CLINIC | Age: 22
End: 2022-07-07

## 2022-07-07 VITALS — DIASTOLIC BLOOD PRESSURE: 58 MMHG | BODY MASS INDEX: 31.75 KG/M2 | SYSTOLIC BLOOD PRESSURE: 118 MMHG | WEIGHT: 173.6 LBS

## 2022-07-07 DIAGNOSIS — Z34.02 ENCOUNTER FOR SUPERVISION OF NORMAL FIRST PREGNANCY IN SECOND TRIMESTER: Primary | ICD-10-CM

## 2022-07-07 DIAGNOSIS — Z13.89 SCREENING FOR GENITOURINARY CONDITION: ICD-10-CM

## 2022-07-07 DIAGNOSIS — Z3A.24 24 WEEKS GESTATION OF PREGNANCY: ICD-10-CM

## 2022-07-07 LAB
GLUCOSE URINE, POC: NEGATIVE
PROTEIN,URINE, POC: NEGATIVE

## 2022-07-07 PROCEDURE — 81002 URINALYSIS NONAUTO W/O SCOPE: CPT | Performed by: OBSTETRICS & GYNECOLOGY

## 2022-07-07 PROCEDURE — 99902 PR PRENATAL VISIT: CPT | Performed by: OBSTETRICS & GYNECOLOGY

## 2022-07-08 LAB
BACTERIA SPEC CULT: ABNORMAL
C TRACH RRNA SPEC QL NAA+PROBE: NEGATIVE
N GONORRHOEA RRNA SPEC QL NAA+PROBE: NEGATIVE
SERVICE CMNT-IMP: ABNORMAL
SPECIMEN SOURCE: NORMAL

## 2022-07-08 NOTE — PROGRESS NOTES
ED pharmacist reviewed recent results of urine culture. The patient was treated with ceftriaxone in the emergency department and received a prescription for cephalexin upon discharge. Based on culture results, the patient is being adequately treated for the identified infection with existing antimicrobial therapy. No further intervention needed. Allergies as of 07/05/2022    (No Known Allergies)     Gerber Mckenzie, PharmD.   Emergency Medicine Clinical Pharmacist

## 2022-08-04 ENCOUNTER — ROUTINE PRENATAL (OUTPATIENT)
Dept: OBGYN CLINIC | Age: 22
End: 2022-08-04

## 2022-08-04 VITALS — BODY MASS INDEX: 33.11 KG/M2 | DIASTOLIC BLOOD PRESSURE: 60 MMHG | WEIGHT: 181 LBS | SYSTOLIC BLOOD PRESSURE: 122 MMHG

## 2022-08-04 DIAGNOSIS — Z3A.28 28 WEEKS GESTATION OF PREGNANCY: ICD-10-CM

## 2022-08-04 DIAGNOSIS — Z34.03 ENCOUNTER FOR SUPERVISION OF NORMAL FIRST PREGNANCY IN THIRD TRIMESTER: Primary | ICD-10-CM

## 2022-08-04 DIAGNOSIS — Z13.0 SCREENING FOR IRON DEFICIENCY ANEMIA: ICD-10-CM

## 2022-08-04 DIAGNOSIS — Z13.89 SCREENING FOR GENITOURINARY CONDITION: ICD-10-CM

## 2022-08-04 DIAGNOSIS — Z13.1 SCREENING FOR DIABETES MELLITUS: ICD-10-CM

## 2022-08-04 LAB
GLUCOSE URINE, POC: NEGATIVE
PROTEIN,URINE, POC: NEGATIVE

## 2022-08-04 PROCEDURE — 99902 PR PRENATAL VISIT: CPT | Performed by: OBSTETRICS & GYNECOLOGY

## 2022-08-04 PROCEDURE — 81002 URINALYSIS NONAUTO W/O SCOPE: CPT | Performed by: OBSTETRICS & GYNECOLOGY

## 2022-08-05 LAB
GLUCOSE 1 HOUR: 102 MG/DL
HGB BLD-MCNC: 10.3 G/DL (ref 11.7–15.4)

## 2022-09-01 ENCOUNTER — ROUTINE PRENATAL (OUTPATIENT)
Dept: OBGYN CLINIC | Age: 22
End: 2022-09-01

## 2022-09-01 VITALS — DIASTOLIC BLOOD PRESSURE: 68 MMHG | BODY MASS INDEX: 33.98 KG/M2 | WEIGHT: 185.8 LBS | SYSTOLIC BLOOD PRESSURE: 124 MMHG

## 2022-09-01 DIAGNOSIS — Z34.03 ENCOUNTER FOR SUPERVISION OF NORMAL FIRST PREGNANCY IN THIRD TRIMESTER: Primary | ICD-10-CM

## 2022-09-01 DIAGNOSIS — Z13.89 SCREENING FOR GENITOURINARY CONDITION: ICD-10-CM

## 2022-09-01 DIAGNOSIS — Z3A.32 32 WEEKS GESTATION OF PREGNANCY: ICD-10-CM

## 2022-09-01 LAB
GLUCOSE URINE, POC: NEGATIVE
PROTEIN,URINE, POC: NEGATIVE

## 2022-09-01 PROCEDURE — 99902 PR PRENATAL VISIT: CPT | Performed by: OBSTETRICS & GYNECOLOGY

## 2022-09-01 PROCEDURE — 81002 URINALYSIS NONAUTO W/O SCOPE: CPT | Performed by: OBSTETRICS & GYNECOLOGY

## 2022-09-14 ENCOUNTER — ROUTINE PRENATAL (OUTPATIENT)
Dept: OBGYN CLINIC | Age: 22
End: 2022-09-14

## 2022-09-14 VITALS — WEIGHT: 190 LBS | BODY MASS INDEX: 34.75 KG/M2 | SYSTOLIC BLOOD PRESSURE: 116 MMHG | DIASTOLIC BLOOD PRESSURE: 68 MMHG

## 2022-09-14 DIAGNOSIS — Z13.89 SCREENING FOR GENITOURINARY CONDITION: ICD-10-CM

## 2022-09-14 DIAGNOSIS — Z34.03 ENCOUNTER FOR SUPERVISION OF NORMAL FIRST PREGNANCY IN THIRD TRIMESTER: Primary | ICD-10-CM

## 2022-09-14 DIAGNOSIS — Z3A.34 34 WEEKS GESTATION OF PREGNANCY: ICD-10-CM

## 2022-09-14 LAB
GLUCOSE URINE, POC: NEGATIVE
PROTEIN,URINE, POC: NEGATIVE

## 2022-09-14 PROCEDURE — 81002 URINALYSIS NONAUTO W/O SCOPE: CPT | Performed by: OBSTETRICS & GYNECOLOGY

## 2022-09-14 PROCEDURE — 99902 PR PRENATAL VISIT: CPT | Performed by: OBSTETRICS & GYNECOLOGY

## 2022-09-22 ENCOUNTER — HOSPITAL ENCOUNTER (INPATIENT)
Age: 22
LOS: 3 days | Discharge: HOME | End: 2022-09-25
Attending: OBSTETRICS & GYNECOLOGY | Admitting: OBSTETRICS & GYNECOLOGY

## 2022-09-22 ENCOUNTER — HOSPITAL ENCOUNTER (EMERGENCY)
Age: 22
Discharge: ANOTHER HOSPITAL | End: 2022-09-22
Attending: EMERGENCY MEDICINE

## 2022-09-22 VITALS
WEIGHT: 185 LBS | HEART RATE: 89 BPM | BODY MASS INDEX: 34.04 KG/M2 | OXYGEN SATURATION: 98 % | DIASTOLIC BLOOD PRESSURE: 84 MMHG | TEMPERATURE: 98.1 F | SYSTOLIC BLOOD PRESSURE: 136 MMHG | RESPIRATION RATE: 20 BRPM | HEIGHT: 62 IN

## 2022-09-22 DIAGNOSIS — O60.03 PRETERM LABOR IN THIRD TRIMESTER WITHOUT DELIVERY: Primary | ICD-10-CM

## 2022-09-22 LAB
ABO + RH BLD: NORMAL
BACTERIA SPEC CULT: NORMAL
BASOPHILS # BLD: 0.1 K/UL (ref 0–0.2)
BASOPHILS NFR BLD: 1 % (ref 0–2)
BLOOD GROUP ANTIBODIES SERPL: NORMAL
DIFFERENTIAL METHOD BLD: ABNORMAL
EOSINOPHIL # BLD: 0.2 K/UL (ref 0–0.8)
EOSINOPHIL NFR BLD: 2 % (ref 0.5–7.8)
ERYTHROCYTE [DISTWIDTH] IN BLOOD BY AUTOMATED COUNT: 15.1 % (ref 11.9–14.6)
HCT VFR BLD AUTO: 35.5 % (ref 35.8–46.3)
HGB BLD-MCNC: 11.4 G/DL (ref 11.7–15.4)
IMM GRANULOCYTES # BLD AUTO: 0.2 K/UL (ref 0–0.5)
IMM GRANULOCYTES NFR BLD AUTO: 2 % (ref 0–5)
LYMPHOCYTES # BLD: 2.2 K/UL (ref 0.5–4.6)
LYMPHOCYTES NFR BLD: 23 % (ref 13–44)
MCH RBC QN AUTO: 26.6 PG (ref 26.1–32.9)
MCHC RBC AUTO-ENTMCNC: 32.1 G/DL (ref 31.4–35)
MCV RBC AUTO: 82.8 FL (ref 79.6–97.8)
MONOCYTES # BLD: 0.6 K/UL (ref 0.1–1.3)
MONOCYTES NFR BLD: 6 % (ref 4–12)
NEUTS SEG # BLD: 6.2 K/UL (ref 1.7–8.2)
NEUTS SEG NFR BLD: 66 % (ref 43–78)
NRBC # BLD: 0.08 K/UL (ref 0–0.2)
PLATELET # BLD AUTO: 208 K/UL (ref 150–450)
PMV BLD AUTO: 11.9 FL (ref 9.4–12.3)
RBC # BLD AUTO: 4.29 M/UL (ref 4.05–5.2)
SERVICE CMNT-IMP: NORMAL
SPECIMEN EXP DATE BLD: NORMAL
WBC # BLD AUTO: 9.3 K/UL (ref 4.3–11.1)

## 2022-09-22 PROCEDURE — 99285 EMERGENCY DEPT VISIT HI MDM: CPT

## 2022-09-22 PROCEDURE — 87081 CULTURE SCREEN ONLY: CPT

## 2022-09-22 PROCEDURE — 6360000002 HC RX W HCPCS: Performed by: OBSTETRICS & GYNECOLOGY

## 2022-09-22 PROCEDURE — 86901 BLOOD TYPING SEROLOGIC RH(D): CPT

## 2022-09-22 PROCEDURE — 1100000000 HC RM PRIVATE

## 2022-09-22 PROCEDURE — 2580000003 HC RX 258: Performed by: OBSTETRICS & GYNECOLOGY

## 2022-09-22 PROCEDURE — 87653 STREP B DNA AMP PROBE: CPT

## 2022-09-22 PROCEDURE — 36415 COLL VENOUS BLD VENIPUNCTURE: CPT

## 2022-09-22 PROCEDURE — 7210000100 HC LABOR FEE PER 1 HR

## 2022-09-22 PROCEDURE — 85025 COMPLETE CBC W/AUTO DIFF WBC: CPT

## 2022-09-22 RX ORDER — SODIUM CHLORIDE 0.9 % (FLUSH) 0.9 %
5-40 SYRINGE (ML) INJECTION PRN
Status: DISCONTINUED | OUTPATIENT
Start: 2022-09-22 | End: 2022-09-23 | Stop reason: HOSPADM

## 2022-09-22 RX ORDER — DOCUSATE SODIUM 100 MG/1
100 CAPSULE, LIQUID FILLED ORAL 2 TIMES DAILY
Status: CANCELLED | OUTPATIENT
Start: 2022-09-22

## 2022-09-22 RX ORDER — TRANEXAMIC ACID 10 MG/ML
1000 INJECTION, SOLUTION INTRAVENOUS
Status: ACTIVE | OUTPATIENT
Start: 2022-09-22 | End: 2022-09-22

## 2022-09-22 RX ORDER — SODIUM CHLORIDE, SODIUM LACTATE, POTASSIUM CHLORIDE, AND CALCIUM CHLORIDE .6; .31; .03; .02 G/100ML; G/100ML; G/100ML; G/100ML
1000 INJECTION, SOLUTION INTRAVENOUS PRN
Status: DISCONTINUED | OUTPATIENT
Start: 2022-09-22 | End: 2022-09-23 | Stop reason: HOSPADM

## 2022-09-22 RX ORDER — BETAMETHASONE SODIUM PHOSPHATE AND BETAMETHASONE ACETATE 3; 3 MG/ML; MG/ML
12 INJECTION, SUSPENSION INTRA-ARTICULAR; INTRALESIONAL; INTRAMUSCULAR; SOFT TISSUE DAILY
Status: DISCONTINUED | OUTPATIENT
Start: 2022-09-22 | End: 2022-09-22

## 2022-09-22 RX ORDER — SODIUM CHLORIDE, SODIUM LACTATE, POTASSIUM CHLORIDE, CALCIUM CHLORIDE 600; 310; 30; 20 MG/100ML; MG/100ML; MG/100ML; MG/100ML
INJECTION, SOLUTION INTRAVENOUS CONTINUOUS
Status: DISCONTINUED | OUTPATIENT
Start: 2022-09-22 | End: 2022-09-23 | Stop reason: HOSPADM

## 2022-09-22 RX ORDER — SODIUM CHLORIDE, SODIUM LACTATE, POTASSIUM CHLORIDE, CALCIUM CHLORIDE 600; 310; 30; 20 MG/100ML; MG/100ML; MG/100ML; MG/100ML
INJECTION, SOLUTION INTRAVENOUS CONTINUOUS
Status: CANCELLED | OUTPATIENT
Start: 2022-09-22

## 2022-09-22 RX ORDER — SODIUM CHLORIDE 0.9 % (FLUSH) 0.9 %
5-40 SYRINGE (ML) INJECTION EVERY 12 HOURS SCHEDULED
Status: CANCELLED | OUTPATIENT
Start: 2022-09-22

## 2022-09-22 RX ORDER — SODIUM CHLORIDE 9 MG/ML
25 INJECTION, SOLUTION INTRAVENOUS PRN
Status: DISCONTINUED | OUTPATIENT
Start: 2022-09-22 | End: 2022-09-23 | Stop reason: HOSPADM

## 2022-09-22 RX ORDER — SODIUM CHLORIDE, SODIUM LACTATE, POTASSIUM CHLORIDE, AND CALCIUM CHLORIDE .6; .31; .03; .02 G/100ML; G/100ML; G/100ML; G/100ML
500 INJECTION, SOLUTION INTRAVENOUS PRN
Status: DISCONTINUED | OUTPATIENT
Start: 2022-09-22 | End: 2022-09-23 | Stop reason: HOSPADM

## 2022-09-22 RX ORDER — SODIUM CHLORIDE, SODIUM LACTATE, POTASSIUM CHLORIDE, AND CALCIUM CHLORIDE .6; .31; .03; .02 G/100ML; G/100ML; G/100ML; G/100ML
500 INJECTION, SOLUTION INTRAVENOUS PRN
Status: CANCELLED | OUTPATIENT
Start: 2022-09-22

## 2022-09-22 RX ORDER — BETAMETHASONE SODIUM PHOSPHATE AND BETAMETHASONE ACETATE 3; 3 MG/ML; MG/ML
12 INJECTION, SUSPENSION INTRA-ARTICULAR; INTRALESIONAL; INTRAMUSCULAR; SOFT TISSUE EVERY 12 HOURS SCHEDULED
Status: COMPLETED | OUTPATIENT
Start: 2022-09-23 | End: 2022-09-23

## 2022-09-22 RX ORDER — ONDANSETRON 2 MG/ML
4 INJECTION INTRAMUSCULAR; INTRAVENOUS EVERY 6 HOURS PRN
Status: DISCONTINUED | OUTPATIENT
Start: 2022-09-22 | End: 2022-09-23 | Stop reason: HOSPADM

## 2022-09-22 RX ORDER — CARBOPROST TROMETHAMINE 250 UG/ML
250 INJECTION, SOLUTION INTRAMUSCULAR PRN
Status: CANCELLED | OUTPATIENT
Start: 2022-09-22

## 2022-09-22 RX ORDER — METHYLERGONOVINE MALEATE 0.2 MG/ML
200 INJECTION INTRAVENOUS
Status: ACTIVE | OUTPATIENT
Start: 2022-09-22 | End: 2022-09-22

## 2022-09-22 RX ORDER — METHYLERGONOVINE MALEATE 0.2 MG/ML
200 INJECTION INTRAVENOUS PRN
Status: CANCELLED | OUTPATIENT
Start: 2022-09-22

## 2022-09-22 RX ORDER — ACETAMINOPHEN 325 MG/1
650 TABLET ORAL EVERY 4 HOURS PRN
Status: DISCONTINUED | OUTPATIENT
Start: 2022-09-22 | End: 2022-09-23 | Stop reason: HOSPADM

## 2022-09-22 RX ORDER — SODIUM CHLORIDE, SODIUM LACTATE, POTASSIUM CHLORIDE, AND CALCIUM CHLORIDE .6; .31; .03; .02 G/100ML; G/100ML; G/100ML; G/100ML
1000 INJECTION, SOLUTION INTRAVENOUS PRN
Status: CANCELLED | OUTPATIENT
Start: 2022-09-22

## 2022-09-22 RX ORDER — MISOPROSTOL 200 UG/1
800 TABLET ORAL
Status: ACTIVE | OUTPATIENT
Start: 2022-09-22 | End: 2022-09-22

## 2022-09-22 RX ORDER — SODIUM CHLORIDE 9 MG/ML
25 INJECTION, SOLUTION INTRAVENOUS PRN
Status: CANCELLED | OUTPATIENT
Start: 2022-09-22

## 2022-09-22 RX ORDER — SODIUM CHLORIDE 0.9 % (FLUSH) 0.9 %
5-40 SYRINGE (ML) INJECTION PRN
Status: CANCELLED | OUTPATIENT
Start: 2022-09-22

## 2022-09-22 RX ORDER — CARBOPROST TROMETHAMINE 250 UG/ML
250 INJECTION, SOLUTION INTRAMUSCULAR
Status: ACTIVE | OUTPATIENT
Start: 2022-09-22 | End: 2022-09-22

## 2022-09-22 RX ORDER — MISOPROSTOL 200 UG/1
800 TABLET ORAL PRN
Status: CANCELLED | OUTPATIENT
Start: 2022-09-22

## 2022-09-22 RX ORDER — ONDANSETRON 2 MG/ML
4 INJECTION INTRAMUSCULAR; INTRAVENOUS EVERY 6 HOURS PRN
Status: CANCELLED | OUTPATIENT
Start: 2022-09-22

## 2022-09-22 RX ORDER — DOCUSATE SODIUM 100 MG/1
100 CAPSULE, LIQUID FILLED ORAL 2 TIMES DAILY
Status: DISCONTINUED | OUTPATIENT
Start: 2022-09-22 | End: 2022-09-23

## 2022-09-22 RX ORDER — SODIUM CHLORIDE 0.9 % (FLUSH) 0.9 %
5-40 SYRINGE (ML) INJECTION EVERY 12 HOURS SCHEDULED
Status: DISCONTINUED | OUTPATIENT
Start: 2022-09-22 | End: 2022-09-23 | Stop reason: HOSPADM

## 2022-09-22 RX ORDER — ACETAMINOPHEN 325 MG/1
650 TABLET ORAL EVERY 4 HOURS PRN
Status: CANCELLED | OUTPATIENT
Start: 2022-09-22

## 2022-09-22 RX ORDER — TRANEXAMIC ACID 10 MG/ML
1000 INJECTION, SOLUTION INTRAVENOUS
Status: CANCELLED | OUTPATIENT
Start: 2022-09-22 | End: 2022-09-22

## 2022-09-22 RX ADMIN — SODIUM CHLORIDE 5 MILLION UNITS: 900 INJECTION INTRAVENOUS at 12:33

## 2022-09-22 RX ADMIN — BETAMETHASONE SODIUM PHOSPHATE AND BETAMETHASONE ACETATE 12 MG: 3; 3 INJECTION, SUSPENSION INTRA-ARTICULAR; INTRALESIONAL; INTRAMUSCULAR at 12:15

## 2022-09-22 RX ADMIN — SODIUM CHLORIDE 2.5 MILLION UNITS: 9 INJECTION, SOLUTION INTRAVENOUS at 22:12

## 2022-09-22 RX ADMIN — SODIUM CHLORIDE 2.5 MILLION UNITS: 9 INJECTION, SOLUTION INTRAVENOUS at 17:54

## 2022-09-22 RX ADMIN — SODIUM CHLORIDE, POTASSIUM CHLORIDE, SODIUM LACTATE AND CALCIUM CHLORIDE: 600; 310; 30; 20 INJECTION, SOLUTION INTRAVENOUS at 21:00

## 2022-09-22 ASSESSMENT — PAIN - FUNCTIONAL ASSESSMENT: PAIN_FUNCTIONAL_ASSESSMENT: NONE - DENIES PAIN

## 2022-09-22 ASSESSMENT — ENCOUNTER SYMPTOMS
SHORTNESS OF BREATH: 0
ABDOMINAL PAIN: 0
NAUSEA: 1

## 2022-09-22 NOTE — ED NOTES
Report called to Neva Jeter at [facility]. Pt awaiting emergent transport. VSS, PIV established.      Devi Miguel RN  09/22/22 [id number]

## 2022-09-22 NOTE — H&P
History & Physical    Name: Girish Ramries MRN: [id number]  SSN: [id number]    YOB: 2000  Age: 19 y.o. Sex: female      Subjective:     Reason for Triage visit:  35w3d and contractions    History of Present Illness: Ms. Omkar Rice is a 19 y.o.  female with an estimated gestational age of 30w2d with Estimated Date of Delivery: 10/24/22. Patient states that she began having contractions every 30 minutes at 6 am.  They have began getting closer together all day and now are about 10 minutes apart. They are painful when she has them but she is able to rest between them. Pregnancy has been  uncomplicated . Patient denies chest pain, fever, headache , nausea and vomiting, pelvic pressure, right upper quadrant pain  , shortness of breath, swelling, vaginal bleeding , vaginal leaking of fluid , and visual disturbances. OB History    Para Term  AB Living   1   0 0 0 0   SAB IAB Ectopic Molar Multiple Live Births                    # Outcome Date GA Lbr Sergey/2nd Weight Sex Delivery Anes PTL Lv   1 Current              Past Medical History:   Diagnosis Date    Appendicitis 10/05/2021    Ovarian cyst 10/05/2021    20 cm - right ovarian cyst     Past Surgical History:   Procedure Laterality Date    APPENDECTOMY  10/05/2021    OVARIAN CYST REMOVAL  10/05/2021    ovarian cyst excision- right     Social History     Occupational History    Not on file   Tobacco Use    Smoking status: Never    Smokeless tobacco: Never   Substance and Sexual Activity    Alcohol use: Never    Drug use: Never    Sexual activity: Yes     Birth control/protection: None      Family History   Problem Relation Age of Onset    Other Mother         prediabetes    Hypertension Father     Other Paternal Grandfather         prediabetes       No Known Allergies  Prior to Admission medications    Medication Sig Start Date End Date Taking?  Authorizing Provider   ondansetron (ZOFRAN) 4 MG tablet Take 1 tablet by mouth every 8 hours as needed for Nausea or Vomiting  Patient not taking: No sig reported 22   Mike Centeno MD   Prenatal Vit w/Pe-Feqipqncx-GX (PNV PO) Take by mouth    Historical Provider, MD        Review of Systems:  Complete review of systems performed. Those not specifically mentioned in the HPI are either negative are non related to this patient encounter. Objective:     Vitals:    Vitals:    22 1149 22 1150   BP:  (!) 141/62   Pulse:  72   Resp:  16   Temp: 97.7 °F (36.5 °C) 97.7 °F (36.5 °C)   TempSrc: Oral Oral   Weight:  185 lb (83.9 kg)   Height:  5' 2\" (1.575 m)      Temp (24hrs), Av.8 °F (36.6 °C), Min:97.7 °F (36.5 °C), Max:98.1 °F (36.7 °C)    BP  Min: 136/84  Max: 141/62       Physical Exam:  Heart: RRR  Lungs: cta bl  Adb: soft, nt nd, gravid  Ext: no edema noted  CVX: 5/90/-2  Membranes:  Intact  Uterine Activity:  q10-12 minutes  Fetal Heart Rate:  Reactive       Lab/Data Review:  No results found for this or any previous visit (from the past 24 hour(s)). Assessment and Plan:   35w3d PTL. Will provide BMZ x 2  Start PCN and collect rapid GBS. Collect CBC and T and S. D/W Dr. Kaitlyn Figueroa, who agrees with plan. NICU attending aware.

## 2022-09-22 NOTE — ED PROVIDER NOTES
Emergency Department Provider Note                   PCP:                None Provider               Age: 19 y.o. Sex: female     No diagnosis found. DISPOSITION Decision To Transfer 09/22/2022 10:42:48 AM        MDM  Number of Diagnoses or Management Options  Diagnosis management comments: OB hospitalist contacted at [address]. Will arrange for emergent transfer there for further evaluation for possible premature labor. Risk of Complications, Morbidity, and/or Mortality  Presenting problems: high  Diagnostic procedures: high  Management options: high               No orders of the defined types were placed in this encounter. Medications - No data to display    New Prescriptions    No medications on file        Ladarius Gardner is a 19 y.o. female who presents to the Emergency Department with chief complaint of    Chief Complaint   Patient presents with    Pregnancy Problem      24-year-old  female approximately 35 weeks pregnant with her first pregnancy presents with pelvic contractions onset around 6:00 this morning. She does report episodes of contractions 15 to 30 minutes apart. She reports that she did lose a mucous plug 4 days ago. She denies water breakage. She does report some nausea but no vomiting. No vaginal bleeding. The history is provided by the patient. Review of Systems   Constitutional:  Negative for fever. Respiratory:  Negative for shortness of breath. Gastrointestinal:  Positive for nausea. Negative for abdominal pain. Genitourinary:  Positive for pelvic pain. All other systems reviewed and are negative.     Past Medical History:   Diagnosis Date    Appendicitis 10/05/2021    Ovarian cyst 10/05/2021    20 cm - right ovarian cyst        Past Surgical History:   Procedure Laterality Date    APPENDECTOMY  10/05/2021    OVARIAN CYST REMOVAL  10/05/2021    ovarian cyst excision- right        Family History   Problem Relation Age of Onset    Other Mother         prediabetes    Hypertension Father     Other Paternal Grandfather         prediabetes        Social History     Socioeconomic History    Marital status: Single   Tobacco Use    Smoking status: Never    Smokeless tobacco: Never   Substance and Sexual Activity    Alcohol use: Never    Drug use: Never         Patient has no known allergies. Previous Medications    ONDANSETRON (ZOFRAN) 4 MG TABLET    Take 1 tablet by mouth every 8 hours as needed for Nausea or Vomiting    PRENATAL VIT W/NP-RSCACCKWM-CU (PNV PO)    Take by mouth        Vitals signs and nursing note reviewed. Patient Vitals for the past 4 hrs:   Temp Pulse Resp BP SpO2   09/22/22 1029 98.1 °F (36.7 °C) 89 20 136/84 98 %          Physical Exam  Vitals and nursing note reviewed. Constitutional:       General: She is not in acute distress. Appearance: Normal appearance. She is not toxic-appearing. HENT:      Head: Normocephalic and atraumatic. Nose: Nose normal.      Mouth/Throat:      Mouth: Mucous membranes are moist.   Eyes:      Pupils: Pupils are equal, round, and reactive to light. Cardiovascular:      Rate and Rhythm: Normal rate and regular rhythm. Pulmonary:      Effort: Pulmonary effort is normal.      Breath sounds: Normal breath sounds. Abdominal:      General: There is no distension. Palpations: Abdomen is soft. Tenderness: There is no abdominal tenderness. Genitourinary:     Comments: On pelvic exam cervix feels dilated roughly 4 cm. No blood. Musculoskeletal:      Cervical back: Normal range of motion and neck supple. Skin:     General: Skin is warm and dry. Neurological:      Mental Status: She is alert and oriented to person, place, and time. Psychiatric:         Mood and Affect: Mood normal.         Behavior: Behavior normal.        Procedures    No results found for any visits on 09/22/22.      No orders to display                       Voice dictation software was used during the making of this note. This software is not perfect and grammatical and other typographical errors may be present. This note has not been completely proofread for errors.      Michelle Manzano MD  09/22/22 [id number]

## 2022-09-22 NOTE — ED TRIAGE NOTES
Patient ambulatory to triage with mask in place. Patient reports she is 35.3 weeks pregnant. Pt report she started having contractions around 0600 this morning. Pt reports he contractions are about every 30 mins and her water has not broken.

## 2022-09-23 ENCOUNTER — ANESTHESIA (OUTPATIENT)
Dept: LABOR AND DELIVERY | Age: 22
End: 2022-09-23

## 2022-09-23 ENCOUNTER — ANESTHESIA EVENT (OUTPATIENT)
Dept: LABOR AND DELIVERY | Age: 22
End: 2022-09-23

## 2022-09-23 LAB
BASE DEFICIT BLD-SCNC: 4.3 MMOL/L
BASE DEFICIT BLD-SCNC: 6.5 MMOL/L
HCO3 BLD-SCNC: 21.1 MMOL/L (ref 22–26)
HCO3 BLDV-SCNC: 21.6 MMOL/L (ref 23–28)
PCO2 BLDCO: 41 MMHG (ref 32–68)
PCO2 BLDCO: 48 MMHG (ref 32–68)
PH BLDCO: 7.25 [PH] (ref 7.15–7.38)
PH BLDCO: 7.33 [PH] (ref 7.15–7.38)
PO2 BLDCO: 22 MMHG
PO2 BLDCO: 26 MMHG
SAO2 % BLD: 28.6 % (ref 95–98)
SAO2 % BLDV: 43.9 % (ref 65–88)
SERVICE CMNT-IMP: ABNORMAL
SERVICE CMNT-IMP: ABNORMAL
SPECIMEN TYPE: ABNORMAL
SPECIMEN TYPE: ABNORMAL

## 2022-09-23 PROCEDURE — 10907ZC DRAINAGE OF AMNIOTIC FLUID, THERAPEUTIC FROM PRODUCTS OF CONCEPTION, VIA NATURAL OR ARTIFICIAL OPENING: ICD-10-PCS | Performed by: OBSTETRICS & GYNECOLOGY

## 2022-09-23 PROCEDURE — 7100000010 HC PHASE II RECOVERY - FIRST 15 MIN

## 2022-09-23 PROCEDURE — 2500000003 HC RX 250 WO HCPCS: Performed by: ANESTHESIOLOGY

## 2022-09-23 PROCEDURE — 51701 INSERT BLADDER CATHETER: CPT

## 2022-09-23 PROCEDURE — 6370000000 HC RX 637 (ALT 250 FOR IP): Performed by: OBSTETRICS & GYNECOLOGY

## 2022-09-23 PROCEDURE — 6360000002 HC RX W HCPCS: Performed by: ANESTHESIOLOGY

## 2022-09-23 PROCEDURE — 00HU33Z INSERTION OF INFUSION DEVICE INTO SPINAL CANAL, PERCUTANEOUS APPROACH: ICD-10-PCS | Performed by: ANESTHESIOLOGY

## 2022-09-23 PROCEDURE — 59400 OBSTETRICAL CARE: CPT | Performed by: OBSTETRICS & GYNECOLOGY

## 2022-09-23 PROCEDURE — 7220000101 HC DELIVERY VAGINAL/SINGLE

## 2022-09-23 PROCEDURE — 6360000002 HC RX W HCPCS: Performed by: OBSTETRICS & GYNECOLOGY

## 2022-09-23 PROCEDURE — 36600 WITHDRAWAL OF ARTERIAL BLOOD: CPT

## 2022-09-23 PROCEDURE — 2580000003 HC RX 258: Performed by: ANESTHESIOLOGY

## 2022-09-23 PROCEDURE — 2580000003 HC RX 258: Performed by: OBSTETRICS & GYNECOLOGY

## 2022-09-23 PROCEDURE — 7100000011 HC PHASE II RECOVERY - ADDTL 15 MIN

## 2022-09-23 PROCEDURE — 82803 BLOOD GASES ANY COMBINATION: CPT

## 2022-09-23 PROCEDURE — 3700000025 EPIDURAL BLOCK: Performed by: ANESTHESIOLOGY

## 2022-09-23 PROCEDURE — 1100000000 HC RM PRIVATE

## 2022-09-23 PROCEDURE — 7210000100 HC LABOR FEE PER 1 HR

## 2022-09-23 RX ORDER — DOCUSATE SODIUM 100 MG/1
100 CAPSULE, LIQUID FILLED ORAL 2 TIMES DAILY
Status: DISCONTINUED | OUTPATIENT
Start: 2022-09-23 | End: 2022-09-25 | Stop reason: HOSPADM

## 2022-09-23 RX ORDER — SODIUM CHLORIDE 0.9 % (FLUSH) 0.9 %
5-40 SYRINGE (ML) INJECTION EVERY 12 HOURS SCHEDULED
Status: DISCONTINUED | OUTPATIENT
Start: 2022-09-23 | End: 2022-09-23 | Stop reason: HOSPADM

## 2022-09-23 RX ORDER — SODIUM CHLORIDE, SODIUM LACTATE, POTASSIUM CHLORIDE, CALCIUM CHLORIDE 600; 310; 30; 20 MG/100ML; MG/100ML; MG/100ML; MG/100ML
INJECTION, SOLUTION INTRAVENOUS CONTINUOUS
Status: DISCONTINUED | OUTPATIENT
Start: 2022-09-23 | End: 2022-09-23 | Stop reason: HOSPADM

## 2022-09-23 RX ORDER — LANOLIN
CREAM (ML) TOPICAL PRN
Status: DISCONTINUED | OUTPATIENT
Start: 2022-09-23 | End: 2022-09-25 | Stop reason: HOSPADM

## 2022-09-23 RX ORDER — SODIUM CHLORIDE 9 MG/ML
INJECTION, SOLUTION INTRAVENOUS PRN
Status: DISCONTINUED | OUTPATIENT
Start: 2022-09-23 | End: 2022-09-23 | Stop reason: HOSPADM

## 2022-09-23 RX ORDER — LIDOCAINE HYDROCHLORIDE AND EPINEPHRINE 15; 5 MG/ML; UG/ML
INJECTION, SOLUTION EPIDURAL PRN
Status: DISCONTINUED | OUTPATIENT
Start: 2022-09-23 | End: 2022-09-23 | Stop reason: SDUPTHER

## 2022-09-23 RX ORDER — SODIUM CHLORIDE 0.9 % (FLUSH) 0.9 %
5-40 SYRINGE (ML) INJECTION PRN
Status: DISCONTINUED | OUTPATIENT
Start: 2022-09-23 | End: 2022-09-23 | Stop reason: HOSPADM

## 2022-09-23 RX ORDER — LIDOCAINE HYDROCHLORIDE 10 MG/ML
1 INJECTION, SOLUTION INFILTRATION; PERINEURAL
Status: DISCONTINUED | OUTPATIENT
Start: 2022-09-23 | End: 2022-09-23 | Stop reason: HOSPADM

## 2022-09-23 RX ORDER — SODIUM CHLORIDE 0.9 % (FLUSH) 0.9 %
5-40 SYRINGE (ML) INJECTION EVERY 12 HOURS SCHEDULED
Status: DISCONTINUED | OUTPATIENT
Start: 2022-09-23 | End: 2022-09-25 | Stop reason: ALTCHOICE

## 2022-09-23 RX ORDER — SODIUM CHLORIDE 9 MG/ML
INJECTION, SOLUTION INTRAVENOUS PRN
Status: DISCONTINUED | OUTPATIENT
Start: 2022-09-23 | End: 2022-09-25 | Stop reason: HOSPADM

## 2022-09-23 RX ORDER — SODIUM CHLORIDE, SODIUM LACTATE, POTASSIUM CHLORIDE, CALCIUM CHLORIDE 600; 310; 30; 20 MG/100ML; MG/100ML; MG/100ML; MG/100ML
INJECTION, SOLUTION INTRAVENOUS CONTINUOUS
Status: DISCONTINUED | OUTPATIENT
Start: 2022-09-23 | End: 2022-09-25 | Stop reason: HOSPADM

## 2022-09-23 RX ORDER — ACETAMINOPHEN 500 MG
1000 TABLET ORAL EVERY 8 HOURS PRN
Status: DISCONTINUED | OUTPATIENT
Start: 2022-09-23 | End: 2022-09-25 | Stop reason: HOSPADM

## 2022-09-23 RX ORDER — ONDANSETRON 8 MG/1
8 TABLET, ORALLY DISINTEGRATING ORAL EVERY 8 HOURS PRN
Status: DISCONTINUED | OUTPATIENT
Start: 2022-09-23 | End: 2022-09-25 | Stop reason: HOSPADM

## 2022-09-23 RX ORDER — SODIUM CHLORIDE 0.9 % (FLUSH) 0.9 %
5-40 SYRINGE (ML) INJECTION PRN
Status: DISCONTINUED | OUTPATIENT
Start: 2022-09-23 | End: 2022-09-25 | Stop reason: HOSPADM

## 2022-09-23 RX ORDER — DEXTROSE, SODIUM CHLORIDE, SODIUM LACTATE, POTASSIUM CHLORIDE, AND CALCIUM CHLORIDE 5; .6; .31; .03; .02 G/100ML; G/100ML; G/100ML; G/100ML; G/100ML
INJECTION, SOLUTION INTRAVENOUS CONTINUOUS
Status: DISCONTINUED | OUTPATIENT
Start: 2022-09-23 | End: 2022-09-23

## 2022-09-23 RX ORDER — OXYCODONE HYDROCHLORIDE 5 MG/1
5 TABLET ORAL EVERY 4 HOURS PRN
Status: DISCONTINUED | OUTPATIENT
Start: 2022-09-23 | End: 2022-09-25 | Stop reason: HOSPADM

## 2022-09-23 RX ORDER — FAMOTIDINE 20 MG/1
20 TABLET, FILM COATED ORAL ONCE
Status: DISCONTINUED | OUTPATIENT
Start: 2022-09-23 | End: 2022-09-23 | Stop reason: HOSPADM

## 2022-09-23 RX ORDER — ROPIVACAINE HYDROCHLORIDE 2 MG/ML
INJECTION, SOLUTION EPIDURAL; INFILTRATION; PERINEURAL PRN
Status: DISCONTINUED | OUTPATIENT
Start: 2022-09-23 | End: 2022-09-23 | Stop reason: SDUPTHER

## 2022-09-23 RX ORDER — IBUPROFEN 800 MG/1
800 TABLET ORAL EVERY 8 HOURS
Status: DISCONTINUED | OUTPATIENT
Start: 2022-09-23 | End: 2022-09-25 | Stop reason: HOSPADM

## 2022-09-23 RX ADMIN — SODIUM CHLORIDE 2.5 MILLION UNITS: 9 INJECTION, SOLUTION INTRAVENOUS at 02:47

## 2022-09-23 RX ADMIN — ROPIVACAINE HYDROCHLORIDE 4 ML: 2 INJECTION, SOLUTION EPIDURAL; INFILTRATION; PERINEURAL at 11:52

## 2022-09-23 RX ADMIN — LIDOCAINE HYDROCHLORIDE,EPINEPHRINE BITARTRATE 3.5 ML: 15; .005 INJECTION, SOLUTION EPIDURAL; INFILTRATION; INTRACAUDAL; PERINEURAL at 11:49

## 2022-09-23 RX ADMIN — ROPIVACAINE HYDROCHLORIDE 4 ML: 2 INJECTION, SOLUTION EPIDURAL; INFILTRATION; PERINEURAL at 11:54

## 2022-09-23 RX ADMIN — SODIUM CHLORIDE 2.5 MILLION UNITS: 9 INJECTION, SOLUTION INTRAVENOUS at 11:22

## 2022-09-23 RX ADMIN — ROPIVACAINE HYDROCHLORIDE 8 ML/HR: 2 INJECTION, SOLUTION EPIDURAL; INFILTRATION; PERINEURAL at 11:55

## 2022-09-23 RX ADMIN — DOCUSATE SODIUM 100 MG: 100 CAPSULE ORAL at 21:19

## 2022-09-23 RX ADMIN — Medication 2 MILLI-UNITS/MIN: at 11:21

## 2022-09-23 RX ADMIN — SODIUM CHLORIDE 2.5 MILLION UNITS: 9 INJECTION, SOLUTION INTRAVENOUS at 06:26

## 2022-09-23 RX ADMIN — SODIUM CHLORIDE, POTASSIUM CHLORIDE, SODIUM LACTATE AND CALCIUM CHLORIDE: 600; 310; 30; 20 INJECTION, SOLUTION INTRAVENOUS at 06:30

## 2022-09-23 RX ADMIN — BETAMETHASONE SODIUM PHOSPHATE AND BETAMETHASONE ACETATE 12 MG: 3; 3 INJECTION, SUSPENSION INTRA-ARTICULAR; INTRALESIONAL; INTRAMUSCULAR at 01:04

## 2022-09-23 RX ADMIN — SODIUM CHLORIDE, POTASSIUM CHLORIDE, SODIUM LACTATE AND CALCIUM CHLORIDE: 600; 310; 30; 20 INJECTION, SOLUTION INTRAVENOUS at 11:30

## 2022-09-23 ASSESSMENT — PAIN DESCRIPTION - LOCATION: LOCATION: ABDOMEN

## 2022-09-23 ASSESSMENT — PAIN SCALES - GENERAL: PAINLEVEL_OUTOF10: 7

## 2022-09-23 ASSESSMENT — PAIN DESCRIPTION - ORIENTATION: ORIENTATION: LOWER

## 2022-09-23 ASSESSMENT — PAIN DESCRIPTION - DESCRIPTORS: DESCRIPTORS: CRAMPING

## 2022-09-23 ASSESSMENT — PAIN DESCRIPTION - PAIN TYPE: TYPE: OTHER (COMMENT)

## 2022-09-23 NOTE — ANESTHESIA PRE PROCEDURE
Department of Anesthesiology  Preprocedure Note       Name:  Jeffery Galdamez   Age:  19 y.o.  :  2000                                          MRN:  [id number]         Date:  2022      Surgeon: * No surgeons listed *    Procedure: * No procedures listed *    Medications prior to admission:   Prior to Admission medications    Medication Sig Start Date End Date Taking?  Authorizing Provider   ondansetron (ZOFRAN) 4 MG tablet Take 1 tablet by mouth every 8 hours as needed for Nausea or Vomiting  Patient not taking: No sig reported 22   Shane Cevallos MD   Prenatal Vit w/Dz-Xnlsaqddd-FD (PNV PO) Take by mouth    Historical Provider, MD       Current medications:    Current Facility-Administered Medications   Medication Dose Route Frequency Provider Last Rate Last Admin    oxytocin (PITOCIN) 30 units in 500 mL infusion  1-20 joel-units/min IntraVENous Continuous Shahrzad Melo MD 2 mL/hr at 22 1121 2 joel-units/min at 22 1121    penicillin G potassium 2.5 million units in 0.9% sodium chloride 100 mL IVPB  2.5 Million Units IntraVENous Q4H Shahrzad Melo MD   2.5 Million Units at 22 1122    lidocaine 1 % injection 1 mL  1 mL IntraDERmal Once PRN FLAKITO Sears MD        famotidine (PEPCID) tablet 20 mg  20 mg Oral Once FLAKITO Sears MD        lactated ringers infusion   IntraVENous Continuous L Odalys Sears MD        sodium chloride flush 0.9 % injection 5-40 mL  5-40 mL IntraVENous 2 times per day FLAKITO Sears MD        sodium chloride flush 0.9 % injection 5-40 mL  5-40 mL IntraVENous PRN FLAKITO Sears MD        0.9 % sodium chloride infusion   IntraVENous PRN FLAKITO Sears MD        lactated ringers infusion   IntraVENous Continuous Alexandr Mcclain  mL/hr at 22 0630 New Bag at 22 0630    lactated ringers bolus  500 mL IntraVENous PRN Alexandr Mcclain MD        Or    lactated ringers bolus  1,000 mL IntraVENous PRARACELI Al MD        sodium chloride flush 0.9 % injection 5-40 mL  5-40 mL IntraVENous 2 times per day Stefani Al MD        sodium chloride flush 0.9 % injection 5-40 mL  5-40 mL IntraVENous PRN Stefani Al MD        0.9 % sodium chloride infusion  25 mL IntraVENous PRN Stefani Al MD        ondansetron [facility] injection 4 mg  4 mg IntraVENous Q6H PRN Stefani Al MD        acetaminophen (TYLENOL) tablet 650 mg  650 mg Oral Q4H PRN Stefani Al MD        benzocaine-menthol (DERMOPLAST) 20-0.5 % spray   Topical PRN Stefani Al MD        docusate sodium (COLACE) capsule 100 mg  100 mg Oral BID Stefani Al MD           Allergies:  No Known Allergies    Problem List:    Patient Active Problem List   Diagnosis Code    Pregnancy Z34.90     labor in third trimester without delivery O60.03       Past Medical History:        Diagnosis Date    Appendicitis 10/05/2021    Ovarian cyst 10/05/2021    20 cm - right ovarian cyst       Past Surgical History:        Procedure Laterality Date    APPENDECTOMY  10/05/2021    OVARIAN CYST REMOVAL  10/05/2021    ovarian cyst excision- right       Social History:    Social History     Tobacco Use    Smoking status: Never    Smokeless tobacco: Never   Substance Use Topics    Alcohol use: Never                                Counseling given: Not Answered      Vital Signs (Current):   Vitals:    22 0715 22 0716 22 1108 22 1120   BP:  121/73 136/80 128/84   Pulse:  68 78 88   Resp:  16     Temp: 97.7 °F (36.5 °C) 97.7 °F (36.5 °C)     TempSrc: Oral Oral     SpO2:  99%     Weight:       Height:                                                  BP Readings from Last 3 Encounters:   22 128/84   22 136/84   22 116/68       NPO Status:                                                                                 BMI:   Wt Readings from Last 3 Encounters:   22 185 lb (83.9 kg)   22 185 lb (83.9 kg)   09/14/22 190 lb (86.2 kg)     Body mass index is 33.84 kg/m². CBC:   Lab Results   Component Value Date/Time    WBC 9.3 09/22/2022 12:05 PM    RBC 4.29 09/22/2022 12:05 PM    HGB 11.4 09/22/2022 12:05 PM    HCT 35.5 09/22/2022 12:05 PM    MCV 82.8 09/22/2022 12:05 PM    RDW 15.1 09/22/2022 12:05 PM     09/22/2022 12:05 PM       CMP:   Lab Results   Component Value Date/Time     07/05/2022 12:01 PM    K 3.9 07/05/2022 12:01 PM     07/05/2022 12:01 PM    CO2 24 07/05/2022 12:01 PM    BUN 5 07/05/2022 12:01 PM    CREATININE 0.55 07/05/2022 12:01 PM    GFRAA >60 07/05/2022 12:01 PM    AGRATIO 0.9 02/28/2022 10:40 AM    LABGLOM >60 07/05/2022 12:01 PM    GLUCOSE 100 07/05/2022 12:01 PM    PROT 6.7 07/05/2022 12:01 PM    CALCIUM 9.0 07/05/2022 12:01 PM    BILITOT 0.3 07/05/2022 12:01 PM    ALKPHOS 88 07/05/2022 12:01 PM    ALKPHOS 68 02/28/2022 10:40 AM    AST 13 07/05/2022 12:01 PM    ALT 17 07/05/2022 12:01 PM       POC Tests: No results for input(s): POCGLU, POCNA, POCK, POCCL, POCBUN, POCHEMO, POCHCT in the last 72 hours.     Coags: No results found for: PROTIME, INR, APTT    HCG (If Applicable):   Lab Results   Component Value Date    PREGTESTUR Positive (A) 07/05/2022        ABGs: No results found for: PHART, PO2ART, HOZ5GQV, XGM6AJT, BEART, E2WLRPPL     Type & Screen (If Applicable):  No results found for: LABABO, LABRH    Drug/Infectious Status (If Applicable):  No results found for: HIV, HEPCAB    COVID-19 Screening (If Applicable):   Lab Results   Component Value Date/Time    COVID19 Not detected 10/05/2021 05:15 PM           Anesthesia Evaluation  Patient summary reviewed and Nursing notes reviewed  Airway: Mallampati: II  TM distance: >3 FB   Neck ROM: full  Mouth opening: > = 3 FB   Dental: normal exam         Pulmonary:Negative Pulmonary ROS                              Cardiovascular:Negative CV ROS  Exercise tolerance: good (>4 METS),                     Neuro/Psych: Negative Neuro/Psych ROS              GI/Hepatic/Renal: Neg GI/Hepatic/Renal ROS            Endo/Other: Negative Endo/Other ROS                    Abdominal:             Vascular: negative vascular ROS. Other Findings:           Anesthesia Plan      epidural     ASA 2             Anesthetic plan and risks discussed with patient.                         Narcisa Hammond MD   9/23/2022

## 2022-09-23 NOTE — L&D DELIVERY NOTE
Mother's Information      Labor Events     Labor?: Yes  Cervical Ripening:   Now               Sandra Oconnell 9 [[id number]]      Labor Events     Labor?: Yes   Steroids?: Full Course  Cervical Ripening Date/Time:     Antibiotics Received during Labor?: Yes  Rupture Date/Time: 22 11:12:00   Rupture Type: AROM  Fluid Color: Clear  Fluid Odor: None  Fluid Volume: Scant  Induction: None  Augmentation: AROM, Oxytocin  Labor Complications: None       Anesthesia    Method: Epidural       Start Pushing      Labor onset date/time: 22 11:13:00 Now     Dilation complete date/time: 22 13:15:00 EDT Now     Start pushing date/time: 2022 15:02:00   Decision date/time (emergent ):           Labor Length    1st stage: 2h 02m  2nd stage: 2h 02m  3rd stage: 0h 04m       Delivery ([place])      Delivery Date/Time:  22 15:17:00   Delivery Type: Vaginal, Spontaneous    Details:            [place] Presentation    Presentation: Vertex  Position: Right  _: Occiput  _: Anterior       Shoulder Dystocia    Shoulder Dystocia Present?: No  Add Second Maneuver  Add Third Maneuver  Add Fourth Maneuver  Add Fifth Maneuver  Add Sixth Maneuver  Add Seventh Maneuver  Add Eighth Maneuver  Add Ninth Maneuver       Assisted Delivery Details    Forceps Attempted?: No  Vacuum Extractor Attempted?: No       Document Additional Attempt         Document Additional Attempt                 Cord    Vessels: 3 Vessels  Complications: None  Delayed Cord Clamping?: Yes  Cord Clamped Date/Time: 2022 15:18:00  Cord Blood Disposition: Lab  Gases Sent?: No       Placenta    Date/Time: 2022 15:21:55  Removal: Spontaneous  Appearance: Intact  Disposition: Discarded       Lacerations    Episiotomy: None       Vaginal Counts    Initial Count Personnel:  [address]  Initial Count Verified By: Radhika Spain RN    Sponges Needles Instruments   Initial Counts Correct Correct Correct   Final Counts Correct Correct Correct   Final Count Personnel: Murtaza LEON  Final Count Verified By: Shira Flynn RN  Accurate Final Count?: Yes  If the count is incorrect due to Intentionally Retained Foreign Object (IRFO) add the IRFO LDA in Lines/Drains. Add LDA: Link to [facility]       Blood Loss  Mother: Garry Ahr #[id number]     Start of Mother's Information      Delivery Blood Loss  22 1113 - 22 1539      None                 End of Mother's Information  Mother: Garry Ahr #[id number]                Delivery Providers    Delivering clinician: Farhat Villanueva MD     Provider Role    Farhat Villanueva MD Obstetrician    Nando Reveles RN Primary Nurse    [address] Scrub Tech    Mylene Blackmon RN Charge Nurse              [facility] Assessment    Living Status: Living     Apgar Scoring Key:    0 1 2    Skin Color: Blue or pale Acrocyanotic Completely pink    Heart Rate: Absent <100 bpm >100 bpm    Reflex Irritability: No response Grimace Cry or active withdrawal    Muscle Tone: Limp Some flexion Active motion    Respiratory Effort: Absent Weak cry; hypoventilation Good, crying                      Skin Color:   Heart Rate:   Reflex Irritability:   Muscle Tone:   Respiratory Effort: Total:            1 Minute:        Apgar 1 total from OB History    5 Minute:        Apgar 5 total from OB History    10 Minute:              15 Minute:              20 Minute:                                 Resuscitation    Method: Bulb Suction, Room Air, Stimulation             [facility] Measurements      Birth Weight: 2630 g               Title      Skin to Skin Initiation Date/Time: 22 15:39:42 EDT     Skin to Skin End Date/Time:                   over intact perineum, Baby to maternal chest, delayed cord clamping, Cord relatively short, baby to lower half of abdomen, FOB cut cord. Baby moved to warmer to assess Dr Hossein Tatum present. Spontaneous delivery of placenta.  No lacerations in

## 2022-09-23 NOTE — ANESTHESIA PROCEDURE NOTES
Epidural Block    Patient location during procedure: OB  Start time: 9/23/2022 11:45 AM  End time: 9/23/2022 11:52 AM  Reason for block: labor epidural  Staffing  Performed: anesthesiologist   Anesthesiologist: Hema Hernandez MD  Epidural  Patient position: sitting  Prep: ChloraPrep and site prepped and draped  Patient monitoring: continuous pulse ox and frequent blood pressure checks  Approach: midline  Location: L3-4  Injection technique: LILI saline  Provider prep: mask and sterile gloves  Needle  Needle type: Tuohy   Needle gauge: 17 G  Needle length: 3.5 in  Needle insertion depth: 6 cm  Catheter type: end hole  Catheter size: 19 G  Catheter at skin depth: 12 cm  Test dose: negativeCatheter Secured: tegaderm and tape  Assessment  Hemodynamics: stable  Attempts: 1  Outcomes: patient tolerated procedure well  Preanesthetic Checklist  Completed: patient identified, IV checked, risks and benefits discussed, equipment checked, pre-op evaluation, timeout performed, anesthesia consent given, oxygen available and monitors applied/VS acknowledged

## 2022-09-23 NOTE — PROGRESS NOTES
Patient resting soundly. Irregular uterine irritability with reactive FHT's noted. Will assess SVE at completion of steroids unless indicated sooner. Dr. Jason Harris apprised of patient course.

## 2022-09-23 NOTE — PROGRESS NOTES
Called Dr. Yaquelin Bejarano, let him know pts GBS labs came back negative, he said to discontinue pcn as it was no longer indicated. Also discussed keeping pt to clear fluid diet for the time being.

## 2022-09-23 NOTE — PROGRESS NOTES
Labor check  6/c/AROM   Clear  PT states contractions more intense and appears uncomfortable  Discussed laboring and received steroids will actively manage  Pt voices understanding  NICU consult

## 2022-09-23 NOTE — PROGRESS NOTES
[id number] Pt. Sitting up for epidural placement. EFM tracing maternal HR while pt. Sitting up. [id number] Repositioning pt. To side lying release on both side. RN at bedside attempting to trace EFM.  1502 Sterile straight cath per RN. RN at bedside pushing with pt. RN adjusting EFM attempting to monitor FHR. Dr. Ivan Marquez called per RN for delivery. Pulse ox applied to differentiate maternal and fetal HR. NICU called per RN for delivery. 1517  of viable boy. See delivery summary.

## 2022-09-24 PROCEDURE — 6370000000 HC RX 637 (ALT 250 FOR IP): Performed by: OBSTETRICS & GYNECOLOGY

## 2022-09-24 PROCEDURE — 1100000000 HC RM PRIVATE

## 2022-09-24 RX ADMIN — IBUPROFEN 800 MG: 800 TABLET, FILM COATED ORAL at 23:59

## 2022-09-24 RX ADMIN — DOCUSATE SODIUM 100 MG: 100 CAPSULE ORAL at 08:13

## 2022-09-24 RX ADMIN — IBUPROFEN 800 MG: 800 TABLET, FILM COATED ORAL at 08:13

## 2022-09-24 RX ADMIN — IBUPROFEN 800 MG: 800 TABLET, FILM COATED ORAL at 15:48

## 2022-09-24 RX ADMIN — ACETAMINOPHEN 1000 MG: 500 TABLET, FILM COATED ORAL at 05:42

## 2022-09-24 RX ADMIN — IBUPROFEN 800 MG: 800 TABLET, FILM COATED ORAL at 00:14

## 2022-09-24 RX ADMIN — DOCUSATE SODIUM 100 MG: 100 CAPSULE ORAL at 21:21

## 2022-09-24 ASSESSMENT — PAIN DESCRIPTION - LOCATION
LOCATION: ABDOMEN
LOCATION: ABDOMEN

## 2022-09-24 ASSESSMENT — PAIN SCALES - GENERAL
PAINLEVEL_OUTOF10: 2
PAINLEVEL_OUTOF10: 2
PAINLEVEL_OUTOF10: 0

## 2022-09-24 ASSESSMENT — PAIN DESCRIPTION - ORIENTATION
ORIENTATION: LOWER;MID
ORIENTATION: ANTERIOR

## 2022-09-24 ASSESSMENT — PAIN DESCRIPTION - DESCRIPTORS
DESCRIPTORS: CRAMPING
DESCRIPTORS: CRAMPING

## 2022-09-24 NOTE — ANESTHESIA POSTPROCEDURE EVALUATION
Department of Anesthesiology  Postprocedure Note    Patient: Marcie Bond  MRN: [id number]  YOB: 2000  Date of evaluation: 9/23/2022      Procedure Summary     Date: 09/23/22 Room / Location:     Anesthesia Start: 9777 Anesthesia Stop:     Procedure: Labor Analgesia Diagnosis:     Scheduled Providers:  Responsible Provider: Etta Santos MD    Anesthesia Type: epidural ASA Status: 2          Anesthesia Type: No value filed. Rina Phase I:      Rina Phase II:        Anesthesia Post Evaluation    Patient location during evaluation: bedside  Patient participation: complete - patient participated  Level of consciousness: awake and alert  Airway patency: patent  Nausea & Vomiting: no nausea and no vomiting  Complications: no  Cardiovascular status: hemodynamically stable  Respiratory status: acceptable  Hydration status: euvolemic  Comments: Blood pressure 129/74, pulse 93, temperature 98.7 °F (37.1 °C), temperature source Axillary, resp. rate 16, height 5' 2\" (1.575 m), weight 185 lb (83.9 kg), SpO2 93 %, unknown if currently breastfeeding.       Multimodal analgesia pain management approach

## 2022-09-24 NOTE — LACTATION NOTE
This note was copied from a baby's chart. Individualized Feeding Plan for Breastfeeding   Lactation Services [phone]    As much as possible, hold your baby on your chest so babys bare skin is against your bare skin with a blanket covering babys back, especially 30 minutes before it is time for baby to eat. Watch for early feeding cues such as, licking lips, sucking motions, rooting, hands to mouth. Crying is a late feeding cue. Feed your baby at least 8 times in 24 hours, or more if your baby is showing feeding cues. If baby is sleepy put baby skin to skin and watch for hunger cues. To rouse baby: unwrap, undress, massage hands, feet, & back, change diaper, gently change babys position from lying to sitting. 15-20 minutes on the first breast of active breastfeeding is considered a good feeding. Good, active breastfeeding is when baby is alert, tugging the nipple, their ear may move, and you can hear swallows. Allow baby to finish the first side before changing sides. Sleeping at the breast or only brief, light sucks should not be considered a good, full breastfeed. At each feedin.  Baby needs to NURSE WELL x 15-20 minutes on at least first breast, burp and offer 2nd breast at every feeding. If no sustained latch only attempt at breast for 10 minutes. If baby does not latch on and feed well on at least one side, you should:            2. Double pump for 15 minutes with breast massage and compression. Hand express for an additional 2-3 minutes per side. Pump after each feeding attempt or poor feeding, up to 8 times per day. If you are not putting baby to the breast you need to pump 8 times a day. Pump every 3 hours. 3. Give baby all of the breast milk you obtain using a straight syringe or  curved syringe.     If baby does NOT have enough wet and dirty diapers per day, is jaundiced/lethargic, or has significant weight loss AND you do NOT pump enough milk for each feeding (per volume listed below), formula supplementation may need to be used. Call lactation department /pediatrician if you have concerns. AVERAGE INTAKES OF COLOSTRUM BY HEALTHY  INFANTS:  Time  Day Intake (ml per feeding)  Based on 8 feedings per day. 1st 24 hrs  1 2-10 ml  24-48 hrs  2 5-15 ml  48-72 hrs  3 15-30 ml (0.5-1 oz)  72-96 hrs  4 30-45 ml (1-1.5oz)                          5-6      45-60 ml (1.5-2oz)                           7          60 ml  (2oz) + more as desired       By day 7, baby will need 60 ml or 2 oz at each feeding based on 8 feedings per day & babys weight. (1oz = 30ml). Total milk volume needed in 24 hours by Day 7 is 16 oz per day based on baby's birthweight of 5lb 13oz. The more often baby eats, the less volume they need per feeding. If baby is eating more often than the minimum of 8 times per day, they may take less per feeding. Comments: Even if baby is feeding well at the breast, until baby is closer to full term, recommend pumping 10 minutes after many daytime feeds to help establish strong supply with baby being late . Can give back any extra breast milk to baby.

## 2022-09-24 NOTE — PROGRESS NOTES
Post-Partum Day Number 1 Progress Note    Patient doing well post-partum without significant complaint. Voiding withour difficulty, normal lochia. Vitals:  Patient Vitals for the past 8 hrs:   BP Temp Pulse SpO2   22 0730 -- -- 76 --   22 0653 101/68 98.2 °F (36.8 °C) 74 98 %     Temp (24hrs), Av.2 °F (36.8 °C), Min:97.6 °F (36.4 °C), Max:98.7 °F (37.1 °C)      Vital signs stable, afebrile. Exam:  Patient without distress. Abdomen soft, fundus firm at level of umbilicus, nontender               Perineum with normal lochia noted. Lower extremities are negative for swelling, cords or tenderness. Labs:   Recent Results (from the past 24 hour(s))   POCT Blood Gas, Cord Blood    Collection Time: 22  3:34 PM   Result Value Ref Range    ph, Cord Blood, POC 7.25 7.15 - 7.38      PCO2, Cord Blood, POC 48 32 - 68 mmHg    PO2, Cord Blood, POC 22 mmHg    HCO3, Mixed 21.1 (L) 22 - 26 MMOL/L    SO2c, Arterial, POC 28.6 (L) 95 - 98 %    BASE DEFICIT (POC) 6.5 mmol/L    Specimen type: ARTERIAL CORD      Performed by: Markell Vance    POCT Blood Gas, Cord Blood    Collection Time: 22  3:38 PM   Result Value Ref Range    ph, Cord Blood, POC 7.33 7.15 - 7.38      PCO2, Cord Blood, POC 41 32 - 68 mmHg    PO2, Cord Blood, POC 26 mmHg    HCO3, Venous 21.6 (L) 23 - 28 MMOL/L    SO2, VENOUS (POC) 43.9 (L) 65 - 88 %    BASE DEFICIT (POC) 4.3 mmol/L    Specimen type: VENOUS CORD      Performed by: Markell Vance        Assessment and Plan:  Patient appears to be having uncomplicated post-partum course. Continue routine perineal care and maternal education. Plan discharge tomorrow if no problems occur.

## 2022-09-25 VITALS
DIASTOLIC BLOOD PRESSURE: 77 MMHG | RESPIRATION RATE: 16 BRPM | WEIGHT: 185 LBS | TEMPERATURE: 97.5 F | HEART RATE: 68 BPM | SYSTOLIC BLOOD PRESSURE: 129 MMHG | HEIGHT: 62 IN | BODY MASS INDEX: 34.04 KG/M2 | OXYGEN SATURATION: 98 %

## 2022-09-25 PROCEDURE — 6370000000 HC RX 637 (ALT 250 FOR IP): Performed by: OBSTETRICS & GYNECOLOGY

## 2022-09-25 RX ORDER — IBUPROFEN 800 MG/1
800 TABLET ORAL EVERY 8 HOURS
Qty: 120 TABLET | Refills: 3 | Status: SHIPPED | OUTPATIENT
Start: 2022-09-25

## 2022-09-25 RX ADMIN — DOCUSATE SODIUM 100 MG: 100 CAPSULE ORAL at 07:32

## 2022-09-25 RX ADMIN — IBUPROFEN 800 MG: 800 TABLET, FILM COATED ORAL at 07:32

## 2022-09-25 NOTE — PROGRESS NOTES
Post-Partum Day Number 2 Progress/Discharge Note    Patient doing well post-partum without significant complaint. Voiding without difficulty, normal lochia, positive flatus. Vitals:  Patient Vitals for the past 8 hrs:   BP Temp Temp src Pulse Resp SpO2   22 0755 129/77 97.5 °F (36.4 °C) Oral 68 16 98 %     Temp (24hrs), Av.1 °F (36.7 °C), Min:97.5 °F (36.4 °C), Max:98.8 °F (37.1 °C)      Vital signs stable, afebrile. Exam:  Patient without distress. Abdomen soft, fundus firm at level of umbilicus, nontender               Perineum with normal lochia noted. Lower extremities are negative for swelling, cords or tenderness. Labs: No results found for this or any previous visit (from the past 24 hour(s)). Assessment and Plan:  Patient appears to be having uncomplicated post-partum course. Continue routine perineal care and maternal education.   Plan discharge for today with follow up in our office in 2 weeks

## 2022-09-25 NOTE — DISCHARGE SUMMARY
Obstetrical Discharge Summary     Patient ID:  Kim Zeng  [id number]  6 y.o.  2000    Admit Date: 2022    Discharge Date: 2022     Admitting Physician: Elvis Villarreal MD     Admission Diagnoses:  labor in third trimester without delivery [O60.03]    Discharge Diagnoses: same as above      Additional Diagnoses: none        Hospital Course: Unremarkable                           Information for the patient's :  Toyin Dawson [[id number]]        Immunizations: There is no immunization history for the selected administration types on file for this patient. Group Beta Strep: No components found for: OBEXTGRBS     Patient Instructions:   Current Discharge Medication List        START taking these medications    Details   ibuprofen (ADVIL;MOTRIN) 800 MG tablet Take 1 tablet by mouth in the morning and 1 tablet at noon and 1 tablet in the evening. Qty: 120 tablet, Refills: 3           STOP taking these medications       ondansetron (ZOFRAN) 4 MG tablet Comments:   Reason for Stopping:         Prenatal Vit w/Ur-Fnlptonus-QF (PNV PO) Comments:   Reason for Stopping:               See discharge instructions provided by nursing. Follow-up in 2 weeks.     Signed:  Du Perez MD  2022  12:41 PM

## 2022-09-25 NOTE — LACTATION NOTE

## 2022-09-25 NOTE — DISCHARGE INSTRUCTIONS
After Your Delivery (the Postpartum Period): Care Instructions  Overview     Congratulations on the birth of your baby. Like pregnancy, the  period can be a time of excitement, jodi, and exhaustion. You may look at your wondrous little baby and feel happy. You may also be overwhelmed by your new sleep hours and new responsibilities. At first, babies often sleep during the days and are awake at night. They do not have a pattern or routine. They may make sudden gasps, jerk themselves awake, or look like they have crossed eyes. These are all normal, and they may even make you smile. In these first weeks after delivery, try to take good care of yourself. It may take 4 to 6 weeks to feel like yourself again, and possibly longer if you had a  birth. You will likely feel very tired for several weeks. Your days will be full of ups and downs, but lots of jodi as well. Follow-up care is a key part of your treatment and safety. Be sure to make and go to all appointments, and call your doctor if you are having problems. It's also a good idea to know your test results and keep a list of the medicines you take. How can you care for yourself at home? Take care of your body after delivery  Use pads instead of tampons for the bloody flow that may last as long as 2 weeks. Ease cramps with ibuprofen (Advil, Motrin). Ease soreness of hemorrhoids and the area between your vagina and rectum with ice compresses or witch hazel pads. Ease constipation by drinking lots of fluid and eating high-fiber foods. Ask your doctor about over-the-counter stool softeners. Cleanse yourself with a gentle squeeze of warm water from a bottle instead of wiping with toilet paper. Take a sitz bath in warm water several times a day. Wear a good nursing bra. Ease sore and swollen breasts with warm, wet washcloths. If you aren't breastfeeding, use ice rather than heat for breast soreness.   Your period may not start for several months if you are breastfeeding. You may bleed more, and longer at first, than you did before you got pregnant. Wait until you are healed (about 4 to 6 weeks) before you have sex. Ask your doctor when it is okay for you to have sex. Try not to travel with your baby for 5 or 6 weeks. If you take a long car trip, make frequent stops to walk around and stretch. Pelvic rest for 6wk  NO tub baths, pools, or hot tubs for 6wk    Avoid exhaustion  Rest every day. Try to nap when your baby naps. Ask another adult to be with you for a few days after delivery. Plan for  if you have other children. Stay flexible so you can eat at odd hours and sleep when you need to. Both you and your baby are making new schedules. Plan small trips to get out of the house. Change can make you feel less tired. Ask for help with housework, cooking, and shopping. Remind yourself that your job is to care for your baby. Know about help for postpartum depression  \"Baby blues\" are common for the first 1 to 2 weeks after birth. You may cry or feel sad or irritable for no reason. Rest whenever you can. Being tired makes it harder to handle your emotions. Go for walks with your baby. Talk to your partner, friends, and family about your feelings. If your symptoms last for more than a few weeks, or if you feel very depressed, ask your doctor for help. Postpartum depression can be treated. Support groups and counseling can help. Sometimes medicine can also help. Stay healthy  Eat healthy foods so you have more energy. If you breastfeed, avoid drugs. If you quit smoking during pregnancy, try to stay smoke-free. If you choose to have a drink now and then, have only one drink, and limit the number of occasions that you have a drink. Wait to breastfeed at least 2 hours after you have a drink to reduce the amount of alcohol the baby may get in the milk. Start daily exercise after 4 to 6 weeks, but rest when you feel tired.   Learn exercises to tone your belly. Try Kegel exercises to regain strength in your pelvic muscles. You can do these exercises while you stand or sit. (If doing these exercises causes pain, stop doing them and talk with your doctor.)  Squeeze your muscles as if you were trying not to pass gas. Or squeeze your muscles as if you were stopping the flow of urine. Your belly, legs, and buttocks shouldn't move. Hold the squeeze for 3 seconds, then relax for 5 to 10 seconds. Start with 3 seconds, then add 1 second each week until you are able to squeeze for 10 seconds. Repeat the exercise 10 times a session. Do 3 to 8 sessions a day. Find a class for you and your baby that has an exercise time. If you had a  birth, give yourself a bit more time before you exercise, and be careful. When should you call for help? Share this information with your partner, family, or a friend. They can help you watch for warning signs. Call 911  anytime you think you may need emergency care. For example, call if:    You have thoughts of harming yourself, your baby, or another person. You passed out (lost consciousness). You have chest pain, are short of breath, or cough up blood. You have a seizure. Call your doctor now or seek immediate medical care if:    You have signs of hemorrhage (too much bleeding), such as:  Heavy vaginal bleeding. This means that you are soaking through one or more pads in an hour. Or you pass blood clots bigger than an egg. Feeling dizzy or lightheaded, or you feel like you may faint. Feeling so tired or weak that you cannot do your usual activities. A fast or irregular heartbeat. New or worse belly pain. You have signs of infection, such as:  A fever. Vaginal discharge that smells bad. New or worse belly pain. You have symptoms of a blood clot in your leg (called a deep vein thrombosis), such as:  Pain in the calf, back of the knee, thigh, or groin.   Redness and swelling in your leg or groin. You have signs of preeclampsia, such as:  Sudden swelling of your face, hands, or feet. New vision problems (such as dimness, blurring, or seeing spots). A severe headache. Watch closely for changes in your health, and be sure to contact your doctor if:    Your vaginal bleeding isn't decreasing. You feel sad, anxious, or hopeless for more than a few days. You are having problems with your breasts or breastfeeding. Where can you learn more? Go to https://MobyparkpeEventmag.rueb.MoneyLion. org and sign in to your [employer] account. Enter F027 in the Locaweb box to learn more about \"After Your Delivery (the Postpartum Period): Care Instructions. \"     If you do not have an account, please click on the \"Sign Up Now\" link. Current as of: February 23, 2022               Content Version: 13.4  © 2006-2022 [employer]. Care instructions adapted under license by [facility] ([facility]). If you have questions about a medical condition or this instruction, always ask your healthcare professional. [address] any warranty or liability for your use of this information.

## 2022-09-25 NOTE — LACTATION NOTE
This note was copied from a baby's chart. In to see mom and infant for breast feeding assistance. Tried baby on both breasts in football hold. Baby did not do left breast but did feed for about 5 minutes well on right breast in football hold. Reviewed during that time sign of good latch and difference between nutritive sucking and non nutritive. Mom measured for flange size and pumped after, while dad fed baby bottle of formula. Baby took 20 mls formula. Mom pumped and she gave back 1ml of EBM. Second syringe saved ahead for next feed. Reviewed breast milk storage rules w/ parents. Gave printed copy of feeding plan and reviewed in detail w/ mom at bedside. She verbalized understanding how to use.  Will follow up in am.

## 2022-09-25 NOTE — PROGRESS NOTES
Patient discharged to home per MD orders. Discharge instructions reviewed with patient and pt given a copy. Questions encouraged and answered. Patient verbalizes understanding. Patient escorted by [facility] staff to private vehicle. Pt declines w/c. Stable at discharge.

## 2022-09-25 NOTE — PROGRESS NOTES
Shift assessment complete see flowsheet. Discussed today plan of care with pt. Bleeding precautions given. Offered Tdap vaccine, pt unsure if she wants this vaccine, pt to let RN know if she decides she wants vaccine. No s/s of distress noted. Pt to call with needs/concerns. Pt standing at bedside with call  light in reach.

## 2022-09-25 NOTE — LACTATION NOTE
This note was copied from a baby's chart. In to see mom and infant for discharge. Mom given feeding plan yesterday and verbalizes understanding how to use. Baby taking mostly formula. Encouraged her to pump q 3 hrs if wanting to make milk and can offer the breast as well  to baby a few times a day to have her practice breast feeding. Can increase times at breast as baby is doing well and has energy. Parents plan to buy a pump today, decline pump rental. Reviewed discharge info and how to manage period of engorgement. No further needs at this time.

## 2022-09-26 LAB
BACTERIA SPEC CULT: NORMAL
SERVICE CMNT-IMP: NORMAL

## 2023-01-25 NOTE — PROGRESS NOTES
The patient is a 23 y.o. Ghislaine Denson who is seen for \"heavy vaginal bleeding\" that started 2023. Pt reporting she is changing pads every 2-3 hours. Pt reporting small clots, small amount of cramping. Pt had  2022, no laceration. Infant weight 2630 g. Pt did not come to post partum visits. PT reports no problems since delivery doing well    HISTORY:      Patient's last menstrual period was 2023 (exact date). Sexual History:  has sex with males  Contraception:  none  No current outpatient medications on file prior to visit. No current facility-administered medications on file prior to visit. ROS:  Feeling well. No dyspnea or chest pain on exertion. No abdominal pain, change in bowel habits, black or bloody stools. No urinary tract symptoms. GYN ROS: no breast pain or new or enlarging lumps on self exam.    PHYSICAL EXAM:  Blood pressure 106/62, height 5' 2\" (1.575 m), weight 171 lb 3.2 oz (77.7 kg), last menstrual period 2023, not currently breastfeeding. The patient appears well, alert, oriented x 3, in no distress. Lungs are clear. Heart RRR, no murmurs. Abdomen soft without tenderness, guarding, mass or organomegaly. Pelvic: normal external genitalia, vulva, vagina, cervix, uterus and adnexa. SSE normal cervix bleeding light to moderate  Would like to start [facility]- interested in iud- will start BC pills per pt request now and then return for iud insertion    Hg 13.3    ASSESSMENT:    1.  Vaginal bleeding  -     AMB POC HEMOGLOBIN (HGB)     PLAN:  Will start OC[facility] for menorrhagia ultimately pt interested in iud  Will order and will return for iud insertion   No sex for 2 weeks prior   cytotec          Montserrat rBito MD

## 2023-01-26 ENCOUNTER — OFFICE VISIT (OUTPATIENT)
Dept: OBGYN CLINIC | Age: 23
End: 2023-01-26

## 2023-01-26 VITALS
SYSTOLIC BLOOD PRESSURE: 106 MMHG | HEIGHT: 62 IN | DIASTOLIC BLOOD PRESSURE: 62 MMHG | WEIGHT: 171.2 LBS | BODY MASS INDEX: 31.5 KG/M2

## 2023-01-26 DIAGNOSIS — N93.9 VAGINAL BLEEDING: Primary | ICD-10-CM

## 2023-01-26 DIAGNOSIS — Z30.430 ENCOUNTER FOR IUD INSERTION: ICD-10-CM

## 2023-01-26 DIAGNOSIS — N93.8 DUB (DYSFUNCTIONAL UTERINE BLEEDING): ICD-10-CM

## 2023-01-26 DIAGNOSIS — N92.0 MENORRHAGIA WITH REGULAR CYCLE: ICD-10-CM

## 2023-01-26 LAB — HEMOGLOBIN, POC: 13.6 G/DL

## 2023-01-26 PROCEDURE — 99214 OFFICE O/P EST MOD 30 MIN: CPT | Performed by: OBSTETRICS & GYNECOLOGY

## 2023-01-26 PROCEDURE — 85018 HEMOGLOBIN: CPT | Performed by: OBSTETRICS & GYNECOLOGY

## 2023-01-26 RX ORDER — MISOPROSTOL 200 UG/1
TABLET ORAL
Qty: 2 TABLET | Refills: 0 | Status: SHIPPED | OUTPATIENT
Start: 2023-01-26

## 2023-01-26 RX ORDER — NORETHINDRONE ACETATE AND ETHINYL ESTRADIOL 1MG-20(21)
1 KIT ORAL DAILY
Qty: 1 PACKET | Refills: 11 | Status: SHIPPED | OUTPATIENT
Start: 2023-01-26

## 2023-01-30 ENCOUNTER — TELEPHONE (OUTPATIENT)
Dept: OBGYN CLINIC | Age: 23
End: 2023-01-30

## 2023-03-01 ENCOUNTER — APPOINTMENT (OUTPATIENT)
Dept: ULTRASOUND IMAGING | Age: 23
End: 2023-03-01
Payer: MEDICAID

## 2023-03-01 ENCOUNTER — HOSPITAL ENCOUNTER (EMERGENCY)
Age: 23
Discharge: HOME | End: 2023-03-02
Attending: EMERGENCY MEDICINE
Payer: MEDICAID

## 2023-03-01 DIAGNOSIS — N93.9 VAGINAL BLEEDING: Primary | ICD-10-CM

## 2023-03-01 LAB
ALBUMIN SERPL-MCNC: 3.2 G/DL (ref 3.5–5)
ALBUMIN/GLOB SERPL: 0.8 (ref 0.4–1.6)
ALP SERPL-CCNC: 97 U/L (ref 50–136)
ALT SERPL-CCNC: 46 U/L (ref 12–65)
ANION GAP SERPL CALC-SCNC: 6 MMOL/L (ref 2–11)
AST SERPL-CCNC: 30 U/L (ref 15–37)
BILIRUB SERPL-MCNC: 0.2 MG/DL (ref 0.2–1.1)
BILIRUB UR QL: ABNORMAL
BUN SERPL-MCNC: 12 MG/DL (ref 6–23)
CALCIUM SERPL-MCNC: 9 MG/DL (ref 8.3–10.4)
CHLORIDE SERPL-SCNC: 109 MMOL/L (ref 101–110)
CO2 SERPL-SCNC: 25 MMOL/L (ref 21–32)
CREAT SERPL-MCNC: 0.7 MG/DL (ref 0.6–1)
ERYTHROCYTE [DISTWIDTH] IN BLOOD BY AUTOMATED COUNT: 12.5 % (ref 11.9–14.6)
GLOBULIN SER CALC-MCNC: 4.2 G/DL (ref 2.8–4.5)
GLUCOSE SERPL-MCNC: 128 MG/DL (ref 65–100)
GLUCOSE UR QL STRIP.AUTO: NEGATIVE MG/DL
HCG UR QL: NEGATIVE
HCT VFR BLD AUTO: 31.1 % (ref 35.8–46.3)
HGB BLD-MCNC: 10.3 G/DL (ref 11.7–15.4)
KETONES UR-MCNC: ABNORMAL MG/DL
LEUKOCYTE ESTERASE UR QL STRIP: NEGATIVE
MCH RBC QN AUTO: 28.6 PG (ref 26.1–32.9)
MCHC RBC AUTO-ENTMCNC: 33.1 G/DL (ref 31.4–35)
MCV RBC AUTO: 86.4 FL (ref 82–102)
NITRITE UR QL: NEGATIVE
NRBC # BLD: 0 K/UL (ref 0–0.2)
PH UR: 6 (ref 5–9)
PLATELET # BLD AUTO: 320 K/UL (ref 150–450)
PMV BLD AUTO: 9.5 FL (ref 9.4–12.3)
POTASSIUM SERPL-SCNC: 3.6 MMOL/L (ref 3.5–5.1)
PROT SERPL-MCNC: 7.4 G/DL (ref 6.3–8.2)
PROT UR QL: >300 MG/DL
RBC # BLD AUTO: 3.6 M/UL (ref 4.05–5.2)
RBC # UR STRIP: ABNORMAL
SERVICE CMNT-IMP: ABNORMAL
SODIUM SERPL-SCNC: 140 MMOL/L (ref 133–143)
SP GR UR: >1.03 (ref 1–1.02)
UROBILINOGEN UR QL: 1 EU/DL (ref 0.2–1)
WBC # BLD AUTO: 7.3 K/UL (ref 4.3–11.1)

## 2023-03-01 PROCEDURE — 76830 TRANSVAGINAL US NON-OB: CPT

## 2023-03-01 PROCEDURE — 81003 URINALYSIS AUTO W/O SCOPE: CPT

## 2023-03-01 PROCEDURE — 99284 EMERGENCY DEPT VISIT MOD MDM: CPT

## 2023-03-01 PROCEDURE — 81025 URINE PREGNANCY TEST: CPT

## 2023-03-01 PROCEDURE — 85027 COMPLETE CBC AUTOMATED: CPT

## 2023-03-01 PROCEDURE — 80053 COMPREHEN METABOLIC PANEL: CPT

## 2023-03-01 ASSESSMENT — ENCOUNTER SYMPTOMS
NAUSEA: 0
SHORTNESS OF BREATH: 0
ABDOMINAL PAIN: 0
VOMITING: 0

## 2023-03-01 ASSESSMENT — PAIN - FUNCTIONAL ASSESSMENT: PAIN_FUNCTIONAL_ASSESSMENT: 0-10

## 2023-03-01 ASSESSMENT — PAIN SCALES - GENERAL: PAINLEVEL_OUTOF10: 2

## 2023-03-01 ASSESSMENT — PAIN DESCRIPTION - LOCATION: LOCATION: ABDOMEN

## 2023-03-02 VITALS
SYSTOLIC BLOOD PRESSURE: 116 MMHG | BODY MASS INDEX: 31.28 KG/M2 | RESPIRATION RATE: 14 BRPM | HEART RATE: 75 BPM | DIASTOLIC BLOOD PRESSURE: 78 MMHG | TEMPERATURE: 97.9 F | WEIGHT: 170 LBS | HEIGHT: 62 IN | OXYGEN SATURATION: 99 %

## 2023-03-02 RX ORDER — MEDROXYPROGESTERONE ACETATE 10 MG/1
10 TABLET ORAL DAILY
Qty: 5 TABLET | Refills: 0 | Status: SHIPPED | OUTPATIENT
Start: 2023-03-02 | End: 2023-03-07

## 2023-03-02 NOTE — ED TRIAGE NOTES
Pt presents c/o vaginal bleeding that has been going on for over a month, states she was seen at [facility] and placed on birth control (taken for 1 month) which has helped but she is still bleeding and it got worse on Sunday, reports that she is having to change her pad every 30 minutes. Mild cramping, denies N/V/D/fevers.

## 2023-03-02 NOTE — ED PROVIDER NOTES
Emergency Department Provider Note                   PCP:                On File Not (Inactive)               Age: 23 y.o. Sex: female     DISPOSITION Decision To Discharge 03/02/2023 12:58:21 AM       ICD-10-CM    1. Vaginal bleeding  N93.9           MEDICAL DECISION MAKING  Complexity of Problems Addressed:  1 chronic illness with exacerbation    Data Reviewed and Analyzed:  Category 1:   I reviewed records from an external source: provider visit notes from outside specialist.  I ordered each unique test.  I reviewed the results of each unique test.        Category 2:   I independently ordered and reviewed the Ultrasound. I independently ordered and reviewed the labs. Category 3: Discussion of management or test interpretation. Patient is a 18-year-old female who presented today for worsening vaginal bleeding and some pelvic cramping. Labs here today look good. Ultrasound reviewed showed question of potential retained products of conception, endometrial hyperplasia, carcinoma, or polyp. Given these results I did reach out to the on-duty OB/GYN hospitalist.  He stated very low suspicion that this was retained products of conception given how far out from burst she was. He stated that as labs appear stable today following up with her OB/GYN is appropriate. He did give the option to prescribe Provera as a continued birth control option as he felt that may help with the bleeding until she can follow-up with her OB/GYN which she recommended in the next few days. All of this was discussed and reviewed with the patient. She reports understanding. She states she would like to do the Provera and plans to call her OB/GYN in the morning to schedule an appointment. Return precautions back to the ED were reviewed prior to discharge. Patient stable for discharge at this time.     Risk of Complications and/or Morbidity of Patient Management:  Prescription drug management completed     Alina Persaud is a 23 y.o. female who presents to the Emergency Department with chief complaint of    Chief Complaint   Patient presents with    Vaginal Bleeding      Patient is a 20-year-old female who reports to facility with complaint of some increased vaginal bleeding. She states she gave birth 5 months ago through vaginal delivery. She saw OB/GYN a month ago when she was bleeding through a pad every 2 hours and her OB/GYN told her that was normal and started her on some birth control. She states that did decrease the bleeding to more of a normal pattern up until a few days ago. She reports as of Sunday she started to have some increased bleeding again and now she feels like she is bleeding through a pad every 30 minutes. She states she has a little bit of pelvic cramping that started this morning but otherwise denies any pain. No fevers, chills, nausea, vomiting, constipation, diarrhea, or any urinary symptoms. She states she tried calling her OB/GYN's office and they did not answer so she came here to the ED. The history is provided by the patient. Review of Systems   Constitutional:  Negative for chills and fever. Respiratory:  Negative for shortness of breath. Cardiovascular:  Negative for chest pain. Gastrointestinal:  Negative for abdominal pain, nausea and vomiting. Genitourinary:  Positive for pelvic pain (cramping) and vaginal bleeding. Negative for dysuria, frequency and urgency. Musculoskeletal:  Negative for gait problem. Neurological:  Negative for dizziness, syncope and headaches. Psychiatric/Behavioral:  Negative for agitation and behavioral problems. All other systems reviewed and are negative. Vitals signs and nursing note reviewed. Patient Vitals for the past 4 hrs:   Pulse Resp BP SpO2   03/02/23 0115 75 14 116/78 99 %   03/02/23 0015 79 15 114/71 98 %          Physical Exam  Vitals and nursing note reviewed. Constitutional:       General: She is not in acute distress. Appearance: Normal appearance. She is not ill-appearing or toxic-appearing. HENT:      Head: Normocephalic and atraumatic. Right Ear: External ear normal.      Left Ear: External ear normal.   Eyes:      Extraocular Movements: Extraocular movements intact. Conjunctiva/sclera: Conjunctivae normal.   Cardiovascular:      Rate and Rhythm: Normal rate and regular rhythm. Pulses: Normal pulses. Heart sounds: Normal heart sounds. Pulmonary:      Effort: Pulmonary effort is normal.      Breath sounds: Normal breath sounds. Abdominal:      General: Abdomen is flat. Bowel sounds are normal. There is no distension. Palpations: Abdomen is soft. Tenderness: There is no abdominal tenderness. There is no guarding or rebound. Musculoskeletal:         General: Normal range of motion. Cervical back: Normal range of motion. Skin:     General: Skin is warm and dry. Capillary Refill: Capillary refill takes less than 2 seconds. Neurological:      General: No focal deficit present. Mental Status: She is alert and oriented to person, place, and time.    Psychiatric:         Mood and Affect: Mood normal.         Behavior: Behavior normal.        Procedures          Orders Placed This Encounter   Procedures    US PELVIS COMPLETE    CBC    Comprehensive Metabolic Panel    POCT Urine Dipstick    POC PREGNANCY UR-QUAL    POCT Urinalysis no Micro    POC Pregnancy Urine Qual        Medications - No data to display    New Prescriptions    MEDROXYPROGESTERONE (PROVERA) 10 MG TABLET    Take 1 tablet by mouth daily for 5 days        Past Medical History:   Diagnosis Date    Appendicitis 10/05/2021    Ovarian cyst 10/05/2021    20 cm - right ovarian cyst        Past Surgical History:   Procedure Laterality Date    APPENDECTOMY  10/05/2021    OVARIAN CYST REMOVAL  10/05/2021    ovarian cyst excision- right        Family History   Problem Relation Age of Onset    Other Mother         prediabetes Hypertension Father     Other Paternal Grandfather         prediabetes        Social History     Socioeconomic History    Marital status: Single     Spouse name: None    Number of children: None    Years of education: None    Highest education level: None   Tobacco Use    Smoking status: Never    Smokeless tobacco: Never   Vaping Use    Vaping Use: Never used   Substance and Sexual Activity    Alcohol use: Never    Drug use: Never    Sexual activity: Yes     Partners: Male     Birth control/protection: None        Allergies: Patient has no known allergies. Previous Medications    MISOPROSTOL (CYTOTEC) 200 MCG TABLET    Take one tab orally and insert one tab into vagina the night before procedure. NORETHINDRONE-ETHINYL ESTRADIOL (LOESTRIN FE 1/20) 1-20 MG-MCG PER TABLET    Take 1 tablet by mouth daily        Results for orders placed or performed during the hospital encounter of 03/01/23   US PELVIS COMPLETE    Narrative    EXAM: Pelvic Ultrasound    INDICATIONS: Bleeding, cramping. History of vaginal delivery 5 months ago. LMP   2/26/2023    TECHNIQUE: Transabdominal and transvaginal pelvic ultrasound. Real-time Duplex   scan was performed using B-Mode/gray scale imaging, color flow and spectral   Doppler analysis to include evaluation of arterial and venous flow. COMPARISON: 1/31/2022    FINDINGS:    Uterus: The uterus is anteverted in position and measures 9.9 x 5.1 x 6.7 cm in   size. The uterine parenchyma is homogeneous without fibroids. Endometrium: The endometrial stripe measures 1 cm. There is vascularity within   the endometrium (images 39 through 41). Adnexa:     Right Ovary: The right ovary is identified and appears normal. It measures    3.4 x 2.1 x 2.9 cm. Arterial and venous color Doppler flow are preserved. Left Ovary: The left ovary is identified and appears normal. It measures    2.8 x 2.1 x 1.7 cm. Arterial and venous color Doppler flow are preserved.      Minimal fluid is present in the cul-de-sac. Impression    1. There is vascularity within the endometrium. This is nonspecific. This could   represent retained products of conception, endometrial hyperplasia, endometrial   carcinoma or polyp. Gynecologic consultation is recommended. 2. The ovaries are within physiologic limits.      Joceline Juarez M.D.   3/2/2023 12:28:00 AM   CBC   Result Value Ref Range    WBC 7.3 4.3 - 11.1 K/uL    RBC 3.60 (L) 4.05 - 5.2 M/uL    Hemoglobin 10.3 (L) 11.7 - 15.4 g/dL    Hematocrit 31.1 (L) 35.8 - 46.3 %    MCV 86.4 82 - 102 FL    MCH 28.6 26.1 - 32.9 PG    MCHC 33.1 31.4 - 35.0 g/dL    RDW 12.5 11.9 - 14.6 %    Platelets 660 904 - 256 K/uL    MPV 9.5 9.4 - 12.3 FL    nRBC 0.00 0.0 - 0.2 K/uL   Comprehensive Metabolic Panel   Result Value Ref Range    Sodium 140 133 - 143 mmol/L    Potassium 3.6 3.5 - 5.1 mmol/L    Chloride 109 101 - 110 mmol/L    CO2 25 21 - 32 mmol/L    Anion Gap 6 2 - 11 mmol/L    Glucose 128 (H) 65 - 100 mg/dL    BUN 12 6 - 23 MG/DL    Creatinine 0.70 0.6 - 1.0 MG/DL    Est, Glom Filt Rate >60 >60 ml/min/1.73m2    Calcium 9.0 8.3 - 10.4 MG/DL    Total Bilirubin 0.2 0.2 - 1.1 MG/DL    ALT 46 12 - 65 U/L    AST 30 15 - 37 U/L    Alk Phosphatase 97 50 - 136 U/L    Total Protein 7.4 6.3 - 8.2 g/dL    Albumin 3.2 (L) 3.5 - 5.0 g/dL    Globulin 4.2 2.8 - 4.5 g/dL    Albumin/Globulin Ratio 0.8 0.4 - 1.6     POCT Urinalysis no Micro   Result Value Ref Range    Specific Gravity, Urine, POC >1.030 (H) 1.001 - 1.023    pH, Urine, POC 6.0 5.0 - 9.0      Protein, Urine, POC >300 (A) NEG mg/dL    Glucose, UA POC Negative NEG mg/dL    Ketones, Urine, POC TRACE (A) NEG mg/dL    Bilirubin, Urine, POC SMALL (A) NEG      Blood, UA POC LARGE (A) NEG      URINE UROBILINOGEN POC 1.0 0.2 - 1.0 EU/dL    Nitrite, Urine, POC Negative NEG      Leukocyte Est, UA POC Negative NEG      Performed by: Darius Interiano    POC Pregnancy Urine Qual   Result Value Ref Range    Preg Test, Ur Negative NEG US PELVIS COMPLETE   Final Result      1. There is vascularity within the endometrium. This is nonspecific. This could    represent retained products of conception, endometrial hyperplasia, endometrial    carcinoma or polyp. Gynecologic consultation is recommended. 2. The ovaries are within physiologic limits. Mingo Betancur M.D.    3/2/2023 12:28:00 AM                        Voice dictation software was used during the making of this note. This software is not perfect and grammatical and other typographical errors may be present. This note has not been completely proofread for errors.      [employer], PA-C  03/02/23 [id number]

## 2023-03-02 NOTE — DISCHARGE INSTRUCTIONS
Labs, specifically her blood levels look good. As we have discussed, we are starting you on Provera to try and help with the bleeding intermittently until you can get with your OB/GYN. Take it once daily as prescribed for the next 5 days. Be sure to call your OB/GYN in the morning and let them know that you were seen in the emergency department and we wanted you to see them in the next few days. Return to the ED as needed if symptoms worsen.

## 2023-03-02 NOTE — ED NOTES
I have reviewed discharge instructions with the patient. The patient verbalized understanding. Patient left ED via Discharge Method: ambulatory to Home with self    Opportunity for questions and clarification provided. Patient given 1 scripts. To continue your aftercare when you leave the hospital, you may receive an automated call from our care team to check in on how you are doing. This is a free service and part of our promise to provide the best care and service to meet your aftercare needs.  If you have questions, or wish to unsubscribe from this service please call [phone]. Thank you for Choosing our [facility] Emergency Department.         Ruiz Jeter RN  03/02/23 [id number]

## 2023-03-08 ENCOUNTER — OFFICE VISIT (OUTPATIENT)
Dept: OBGYN CLINIC | Age: 23
End: 2023-03-08

## 2023-03-08 VITALS
DIASTOLIC BLOOD PRESSURE: 72 MMHG | WEIGHT: 168.2 LBS | HEIGHT: 62 IN | BODY MASS INDEX: 30.95 KG/M2 | SYSTOLIC BLOOD PRESSURE: 116 MMHG

## 2023-03-08 DIAGNOSIS — N93.8 DUB (DYSFUNCTIONAL UTERINE BLEEDING): Primary | ICD-10-CM

## 2023-03-08 DIAGNOSIS — N93.9 VAGINAL BLEEDING: ICD-10-CM

## 2023-03-08 RX ORDER — NORETHINDRONE ACETATE AND ETHINYL ESTRADIOL 1MG-20(21)
1 KIT ORAL DAILY
Qty: 3 PACKET | Refills: 4 | Status: SHIPPED | OUTPATIENT
Start: 2023-03-08

## 2023-03-08 NOTE — PROGRESS NOTES
The patient is a 22 y.o.  who is seen as an ER follow up from 3/1/23 for AUB x past month. Pt was seen by Marcela Cody DO on 23 with complaints of heavy vaginal bleeding that started 23. She was started on OCP (Loestrin Fe ) but would prefer IUD. Pt was advised to follow up for IUD insertion once we receive device. Pt has decided to wait on getting IUD at this time due to issues with insurance.   Pt delivered by vaginal delivery on 22 @ 35 weeks. She was given Provera 10mg x 5 days from her ER visit that patient states she did not take. At visit Hgb was (10.3). Ultrasound results from ER listed below.  Impression:  1. There is vascularity within the endometrium. This is nonspecific. This could    represent retained products of conception, endometrial hyperplasia, endometrial    carcinoma or polyp. Gynecologic consultation is recommended.   2. The ovaries are within physiologic limits.     Ultrasound Findings Today:  Cx appears WNL  Uterus is anteverted and very heterogenous  Endo = 9mm, no intracavitary masses visualized  Endometrium does not appear hypervascular today  Bilateral ovaries WNL  Lt adnexa WNL  Right adnexa simple cyst separate from ovary,   nontender = 3.3 x 2.6 x 3.1 cm      HISTORY:    Sexual History:  has sex with males  Contraception:  OCP (estrogen/progesterone)  Current Outpatient Medications on File Prior to Visit   Medication Sig Dispense Refill    norethindrone-ethinyl estradiol (LOESTRIN FE ) 1-20 MG-MCG per tablet Take 1 tablet by mouth daily 1 packet 11     No current facility-administered medications on file prior to visit.       ROS:  Feeling well. No dyspnea or chest pain on exertion.  No abdominal pain, change in bowel habits, black or bloody stools.  No urinary tract symptoms. GYN ROS: no breast pain or new or enlarging lumps on self exam.    PHYSICAL EXAM:  Blood pressure 116/72, height 5' 2\" (1.575 m), weight 168 lb 3.2 oz (76.3 kg), not  currently breastfeeding. The patient appears well, alert, oriented x 3, in no distress. ASSESSMENT:    1. Vaginal bleeding  -     AMB POC US, TRANSVAGINAL     PLAN:  All questions answered. She is better with her AUB and U/S today shows endo cavity cleared out ( wnl). She has an incidental right ovarian simple cyst 3 cm. She will call back if she has pain here. Contraception: OCP (estrogen/progesterone) she now wishes to cont on these , Rx sent. So no IUD for now.   Diagnosis explained in detail, including differential  Follow up in 1 year, keep menses calendar  Pelvic ultrasound X 2 reviewed  Pregnancy test, result:  checking quant B HCG to r/o GTD

## 2023-03-09 LAB — HCG SERPL-ACNC: <1 MIU/ML (ref 0–6)

## 2023-05-11 ENCOUNTER — OFFICE VISIT (OUTPATIENT)
Dept: OBGYN CLINIC | Age: 23
End: 2023-05-11
Payer: MEDICAID

## 2023-05-11 VITALS
WEIGHT: 167.2 LBS | DIASTOLIC BLOOD PRESSURE: 76 MMHG | SYSTOLIC BLOOD PRESSURE: 114 MMHG | HEIGHT: 62 IN | BODY MASS INDEX: 30.77 KG/M2

## 2023-05-11 DIAGNOSIS — N92.6 MISSED MENSES: Primary | ICD-10-CM

## 2023-05-11 LAB
HCG, PREGNANCY, URINE, POC: NEGATIVE
VALID INTERNAL CONTROL, POC: YES

## 2023-05-11 PROCEDURE — 81025 URINE PREGNANCY TEST: CPT | Performed by: NURSE PRACTITIONER

## 2023-05-11 PROCEDURE — 99213 OFFICE O/P EST LOW 20 MIN: CPT | Performed by: NURSE PRACTITIONER

## 2023-05-12 LAB — HCG SERPL-ACNC: <1 MIU/ML (ref 0–6)

## 2024-02-26 NOTE — PROGRESS NOTES
The patient is a 23 y.o. Nulato Showman who is seen for a missed period. Currently notes LMP was 3/31/23.  historically cycles have always been every 28 lasting 4-5 days.  was primarily started on loestrin due to heavy cycles. She stopped taking the ocp at the end of march because she notice weight gain and an increase in appetite. Would prefer not to be on anything if possible, but would like a pregnancy    HISTORY:      Patient's last menstrual period was 2023 (exact date). Sexual History:  has sex with males  Contraception:  none  Current Outpatient Medications on File Prior to Visit   Medication Sig Dispense Refill    norethindrone-ethinyl estradiol (LOESTRIN FE ) 1-20 MG-MCG per tablet Take 1 tablet by mouth daily 3 packet 4     No current facility-administered medications on file prior to visit. ROS:  Feeling well. No dyspnea or chest pain on exertion. No abdominal pain, change in bowel habits, black or bloody stools. No urinary tract symptoms. GYN ROS: see above. PHYSICAL EXAM:  Blood pressure 114/76, height 5' 2\" (1.575 m), weight 167 lb 3.2 oz (75.8 kg), last menstrual period 2023, not currently breastfeeding. The patient appears well, alert, oriented x 3, in no distress. ASSESSMENT:  Encounter Diagnosis   Name Primary?     Missed menses Yes       PLAN:    UPT today neg  Will check quant for added reassurance  Offered alternate OCP assuming quant neg, but pt prefers no hormonal contraception at this time and is aware of pregnancy risk   Discussed naproxen BID x5 days starting 2-3 days before bleed for menorrhagia if she desires  Assuming neg quant will plan on conservative GYN management at this time  If still no cycle by 3mo encouraged to call - would like US and visit at that point  Pt happy with plan       Orders Placed This Encounter   Procedures    HCG, Quantitative, Pregnancy     Standing Status:   Future     Number of Occurrences:   1     Standing 5

## (undated) DEVICE — GENERAL LAPAROSCOPY: Brand: MEDLINE INDUSTRIES, INC.

## (undated) DEVICE — TROCAR: Brand: KII FIOS FIRST ENTRY

## (undated) DEVICE — SOLUTION IRRIG 1000ML 09% SOD CHL USP PIC PLAS CONTAINER

## (undated) DEVICE — CUTTER ENDOSCP L340MM LIN ARTC SGL STROKE FIRING ENDOPATH

## (undated) DEVICE — INTENDED FOR TISSUE SEPARATION, AND OTHER PROCEDURES THAT REQUIRE A SHARP SURGICAL BLADE TO PUNCTURE OR CUT.: Brand: BARD-PARKER SAFETY BLADES SIZE 11, STERILE

## (undated) DEVICE — RELOAD STPL SZ 0 L45MM DIA3.5MM 0DEG STD REG TISS BLU TI

## (undated) DEVICE — CONTAINER SPEC FRMLN 120ML --

## (undated) DEVICE — VISUALIZATION SYSTEM: Brand: CLEARIFY

## (undated) DEVICE — SHEARS ENDOSCP L36CM DIA5MM ULTRASONIC CRV TIP W/ ADV

## (undated) DEVICE — BAG SPEC REM 224ML W4XL6IN DIA10MM 1 HND GYN DISP ENDOPCH

## (undated) DEVICE — 2, DISPOSABLE SUCTION/IRRIGATOR WITHOUT DISPOSABLE TIP: Brand: STRYKEFLOW

## (undated) DEVICE — PREP SKN CHLRAPRP APL 26ML STR --

## (undated) DEVICE — STRIP,CLOSURE,WOUND,MEDI-STRIP,1/2X4: Brand: MEDLINE

## (undated) DEVICE — GOWN,REINFORCED,POLY,AURORA,XXLARGE,STR: Brand: MEDLINE

## (undated) DEVICE — TOTAL TRAY, DB, 100% SILI FOLEY, 16FR 10: Brand: MEDLINE

## (undated) DEVICE — SUTURE VCRL SZ 3-0 L27IN ABSRB UD L26MM SH 1/2 CIR J416H

## (undated) DEVICE — GARMENT,MEDLINE,DVT,INT,CALF,MED, GEN2: Brand: MEDLINE

## (undated) DEVICE — MASTISOL ADHESIVE LIQ 2/3ML

## (undated) DEVICE — 2000CC GUARDIAN II: Brand: GUARDIAN

## (undated) DEVICE — STANDARD HYPODERMIC NEEDLE,POLYPROPYLENE HUB: Brand: MONOJECT

## (undated) DEVICE — REM POLYHESIVE ADULT PATIENT RETURN ELECTRODE: Brand: VALLEYLAB

## (undated) DEVICE — TROCARS: Brand: KII® BALLOON BLUNT TIP SYSTEM

## (undated) DEVICE — TROCAR: Brand: KII® SLEEVE